# Patient Record
Sex: FEMALE | Race: WHITE | NOT HISPANIC OR LATINO | Employment: UNEMPLOYED | URBAN - NONMETROPOLITAN AREA
[De-identification: names, ages, dates, MRNs, and addresses within clinical notes are randomized per-mention and may not be internally consistent; named-entity substitution may affect disease eponyms.]

---

## 2023-05-11 ENCOUNTER — OFFICE VISIT (OUTPATIENT)
Dept: FAMILY MEDICINE CLINIC | Facility: CLINIC | Age: 53
End: 2023-05-11
Payer: MEDICAID

## 2023-05-11 VITALS
OXYGEN SATURATION: 97 % | TEMPERATURE: 97 F | HEART RATE: 78 BPM | BODY MASS INDEX: 20.88 KG/M2 | WEIGHT: 133 LBS | HEIGHT: 67 IN | SYSTOLIC BLOOD PRESSURE: 124 MMHG | RESPIRATION RATE: 16 BRPM | DIASTOLIC BLOOD PRESSURE: 70 MMHG

## 2023-05-11 DIAGNOSIS — M54.2 CERVICALGIA: Primary | ICD-10-CM

## 2023-05-11 NOTE — PROGRESS NOTES
Follow Up Office Visit      Date: 2023   Patient Name: Fatuma Bolton  : 1970   MRN: [id number]     Chief Complaint:    Chief Complaint   Patient presents with   • Neck Pain     Numbness in arms       History of Present Illness: Fatuma Bolton is a 52 y.o. female who is here today for evaluation of neck pain.  Patient has had previous neck surgery in the past and had symptoms of pain with radiation down her arms.  Patient was recently helping with her sister-in-law who collapsed in the bathroom prompting patient to try to catch her.  At that time she felt a pulling sensation of her neck and has had some pain and numbness down her arm since then.  She states that it is slightly improving and is not associated with any headaches or other symptomatology.  She states it is tender in the muscle region as well as radiation down to her thumbs.  She does have some weakness at present time.  She has tried other modalities that does not appear to be helping such as Tylenol and Motrin.  She denies any other issues at present time.  It has not affected her activity or sleep at present time.  Her appetite is unchanged.  She has otherwise had no other cardiovascular, respiratory, gastrointestinal, urologic or neurologic complaints.  Patient does state that when she smoke a cigarette her pain improves.    Subjective      Review of Systems:   Review of Systems   Constitutional: Negative for activity change, appetite change and fatigue.   Respiratory: Negative for cough and shortness of breath.    Cardiovascular: Negative for chest pain, palpitations and leg swelling.   Gastrointestinal: Negative for abdominal pain, constipation, diarrhea, nausea and vomiting.   Genitourinary: Negative for dysuria, flank pain, frequency and urgency.   Musculoskeletal: Positive for arthralgias, neck pain and neck stiffness. Negative for back pain, gait problem and myalgias.   Neurological: Positive for weakness and numbness. Negative  "for dizziness, tremors, light-headedness, headache and memory problem.   Psychiatric/Behavioral: Negative for hallucinations and sleep disturbance. The patient is not nervous/anxious.        I have reviewed the patients family history, social history, past medical history, past surgical history and have updated it as appropriate.     Medications:   No current outpatient medications on file.    Allergies:   Allergies   Allergen Reactions   • Penicillins Rash       Immunizations:     There is no immunization history on file for this patient.     Objective     Physical Exam: Please see above  Vital Signs:   Vitals:    05/11/23 1306   BP: 124/70   BP Location: Right arm   Patient Position: Sitting   Cuff Size: Adult   Pulse: 78   Resp: 16   Temp: 97 °F (36.1 °C)   SpO2: 97%   Weight: 60.3 kg (133 lb)   Height: 170.2 cm (67\")     Body mass index is 20.83 kg/m².  BMI is within normal parameters. No other follow-up for BMI required.       Physical Exam  Vitals and nursing note reviewed.   Constitutional:       Appearance: Normal appearance.   HENT:      Head: Normocephalic and atraumatic.      Nose: Nose normal.      Mouth/Throat:      Pharynx: Oropharynx is clear.   Eyes:      Extraocular Movements: Extraocular movements intact.      Pupils: Pupils are equal, round, and reactive to light.   Neck:      Thyroid: No thyroid mass or thyromegaly.      Trachea: Trachea normal.   Cardiovascular:      Rate and Rhythm: Normal rate and regular rhythm.      Pulses: Normal pulses. No decreased pulses.      Heart sounds: Normal heart sounds.   Pulmonary:      Effort: Pulmonary effort is normal.      Breath sounds: Normal breath sounds.   Abdominal:      General: Abdomen is flat. Bowel sounds are normal.      Palpations: Abdomen is soft.      Tenderness: There is no abdominal tenderness.   Musculoskeletal:      Cervical back: No signs of trauma, rigidity, torticollis or crepitus. Pain with movement and muscular tenderness present. No " spinous process tenderness. Decreased range of motion.      Right lower leg: No edema.      Left lower leg: No edema.   Lymphadenopathy:      Cervical: No cervical adenopathy.   Skin:     General: Skin is warm and dry.   Neurological:      General: No focal deficit present.      Mental Status: She is alert and oriented to person, place, and time.      Sensory: Sensation is intact.      Motor: Motor function is intact.      Coordination: Coordination is intact.   Psychiatric:         Attention and Perception: Attention normal.         Mood and Affect: Mood normal.         Speech: Speech normal.         Behavior: Behavior normal.         Procedures    Results:   Labs:   No results found for: HGBA1C, CMP, CBCDIFFPANEL, CREAT, TSH     POCT Results (if applicable):   No results found for this or any previous visit.    Imaging:   No valid procedures specified.     Measures:   Advanced Care Planning:   Patient does not have an advance directive, information provided.    Smoking Cessation:   less than 3 minutes spent counseling Will try to cut down    Assessment / Plan      Assessment/Plan:   Diagnoses and all orders for this visit:    1. Cervicalgia (Primary)   Patient does have a history of having neck pain in the past that did require surgical intervention by Dr. Estevez.  Patient's believes that this is very similar to what she had in the past.  She does not wish to have any steroids or muscle relaxers at present time.  Patient has agreed that she will have an x-ray obtained to assure us that there is no hardware or abnormality.  She will agree to prednisone if the x-ray does not show any abnormality.  If it does show concern referral back to the neurosurgeon will be necessary.  If she shows no improvement after the initiation of steroids we may need to send her back to the neurosurgeon anyway.  We will continue to monitor symptoms and if she has other problems or complaints further assessment treatment is necessary.  If  she has increased weakness or disuse or worsening numbness prior to evaluation she will contact us.  -     XR Spine Cervical Complete 4 or 5 View; Future        Follow Up:   No follow-ups on file.      At [facility], we believe that sharing information builds trust and better relationships. You are receiving this note because you recently visited [facility]. It is possible you will see health information before a provider has talked with you about it. This kind of information can be easy to misunderstand. To help you fully understand what it means for your health, we urge you to discuss this note with your provider.    Alfa Clay MD  [facility]

## 2023-05-12 ENCOUNTER — TELEPHONE (OUTPATIENT)
Dept: FAMILY MEDICINE CLINIC | Facility: CLINIC | Age: 53
End: 2023-05-12

## 2023-05-12 DIAGNOSIS — M54.2 CERVICALGIA: Primary | ICD-10-CM

## 2023-05-12 RX ORDER — PREDNISONE 10 MG/1
TABLET ORAL
Qty: 36 TABLET | Refills: 0 | Status: SHIPPED | OUTPATIENT
Start: 2023-05-12 | End: 2023-05-20

## 2023-05-12 RX ORDER — CYCLOBENZAPRINE HCL 10 MG
10 TABLET ORAL 3 TIMES DAILY PRN
Qty: 30 TABLET | Refills: 0 | Status: SHIPPED | OUTPATIENT
Start: 2023-05-12

## 2023-05-12 NOTE — TELEPHONE ENCOUNTER
Spoke with patient advised of xray result and orders per PCP, result is scanned in patient chart, patient verbalized understanding.

## 2023-06-12 ENCOUNTER — OFFICE VISIT (OUTPATIENT)
Dept: FAMILY MEDICINE CLINIC | Facility: CLINIC | Age: 53
End: 2023-06-12
Payer: MEDICAID

## 2023-06-12 VITALS
HEIGHT: 67 IN | DIASTOLIC BLOOD PRESSURE: 78 MMHG | TEMPERATURE: 96.8 F | OXYGEN SATURATION: 100 % | WEIGHT: 138 LBS | BODY MASS INDEX: 21.66 KG/M2 | HEART RATE: 88 BPM | SYSTOLIC BLOOD PRESSURE: 112 MMHG

## 2023-06-12 DIAGNOSIS — M54.2 CERVICALGIA: ICD-10-CM

## 2023-06-12 DIAGNOSIS — Z12.31 ENCOUNTER FOR SCREENING MAMMOGRAM FOR MALIGNANT NEOPLASM OF BREAST: ICD-10-CM

## 2023-06-12 DIAGNOSIS — Z12.11 ENCOUNTER FOR SCREENING COLONOSCOPY: ICD-10-CM

## 2023-06-12 DIAGNOSIS — Z00.00 WELL ADULT EXAM: Primary | ICD-10-CM

## 2023-06-12 DIAGNOSIS — F17.210 CIGARETTE NICOTINE DEPENDENCE WITHOUT COMPLICATION: ICD-10-CM

## 2023-06-12 DIAGNOSIS — Z23 NEED FOR VACCINATION AGAINST STREPTOCOCCUS PNEUMONIAE: ICD-10-CM

## 2023-06-12 DIAGNOSIS — R06.2 WHEEZE: ICD-10-CM

## 2023-06-12 LAB
HBA1C MFR BLD: 6 % (ref 4.8–5.6)
HCV AB SER DONR QL: NORMAL
VIT B12 BLD-MCNC: 301 PG/ML (ref 211–946)

## 2023-06-12 PROCEDURE — 84443 ASSAY THYROID STIM HORMONE: CPT | Performed by: FAMILY MEDICINE

## 2023-06-12 PROCEDURE — 85027 COMPLETE CBC AUTOMATED: CPT | Performed by: FAMILY MEDICINE

## 2023-06-12 PROCEDURE — 81003 URINALYSIS AUTO W/O SCOPE: CPT | Performed by: FAMILY MEDICINE

## 2023-06-12 PROCEDURE — 82607 VITAMIN B-12: CPT | Performed by: FAMILY MEDICINE

## 2023-06-12 PROCEDURE — 80053 COMPREHEN METABOLIC PANEL: CPT | Performed by: FAMILY MEDICINE

## 2023-06-12 PROCEDURE — 86803 HEPATITIS C AB TEST: CPT | Performed by: FAMILY MEDICINE

## 2023-06-12 PROCEDURE — 83036 HEMOGLOBIN GLYCOSYLATED A1C: CPT | Performed by: FAMILY MEDICINE

## 2023-06-12 PROCEDURE — 80061 LIPID PANEL: CPT | Performed by: FAMILY MEDICINE

## 2023-06-12 RX ORDER — ALBUTEROL SULFATE 90 UG/1
2 AEROSOL, METERED RESPIRATORY (INHALATION) EVERY 6 HOURS PRN
Qty: 18 G | Refills: 11 | Status: SHIPPED | OUTPATIENT
Start: 2023-06-12

## 2023-06-12 NOTE — PATIENT INSTRUCTIONS
Advance Care Planning and Advance Directives     You make decisions on a daily basis - decisions about where you want to live, your career, your home, your life. Perhaps one of the most important decisions you face is your choice for future medical care. Take time to talk with your family and your healthcare team and start planning today.  Advance Care Planning is a process that can help you:  Understand possible future healthcare decisions in light of your own experiences  Reflect on those decision in light of your goals and values  Discuss your decisions with those closest to you and the healthcare professionals that care for you  Make a plan by creating a document that reflects your wishes    Surrogate Decision Maker  In the event of a medical emergency, which has left you unable to communicate or to make your own decisions, you would need someone to make decisions for you.  It is important to discuss your preferences for medical treatment with this person while you are in good health.     Qualities of a surrogate decision maker:  Willing to take on this role and responsibility  Knows what you want for future medical care  Willing to follow your wishes even if they don't agree with them  Able to make difficult medical decisions under stressful circumstances    Advance Directives  These are legal documents you can create that will guide your healthcare team and decision maker(s) when needed. These documents can be stored in the electronic medical record.    Living Will - a legal document to guide your care if you have a terminal condition or a serious illness and are unable to communicate. States vary by statute in document names/types, but most forms may include one or more of the following:        -  Directions regarding life-prolonging treatments        -  Directions regarding artificially provided nutrition/hydration        -  Choosing a healthcare decision maker        -  Direction regarding organ/tissue  donation    Durable Power of  for Healthcare - this document names an -in-fact to make medical decisions for you, but it may also allow this person to make personal and financial decisions for you. Please seek the advice of an  if you need this type of document.    **Advance Directives are not required and no one may discriminate against you if you do not sign one.    Medical Orders  Many states allow specific forms/orders signed by your physician to record your wishes for medical treatment in your current state of health. This form, signed in personal communication with your physician, addresses resuscitation and other medical interventions that you may or may not want.      For more information or to schedule a time with a [facility] Advance Care Planning Facilitator contact: [email or web address] or call [phone] and someone will contact you directly.

## 2023-06-12 NOTE — PROGRESS NOTES
"     Female Physical Note      Date: 2023   Patient Name: Fatuma Bolton  : 1970   MRN: [id number]     Chief Complaint:    Chief Complaint   Patient presents with    Follow-up     ED follow up back pain       History of Present Illness: Fatuma Bolton is a 52 y.o. female who is here today for their annual health maintenance and physical.  Patient has continued to have problem with pain between her shoulder blades.  Patient does have a history of having cervical disc disease requiring surgery and was seen in the emergency department for evaluation.  It was noted at that time that she did have some abnormality of C5-C6 with some mild to moderate changes.  Patient is talking with the neurosurgeon and will hopefully have an appointment in the near future.  She does continue to have pain that goes down her arms.  She associates the pain when she coughs or sneezes that also will \"shoot down her legs\".  She denies any bowel bladder incontinence.  She is otherwise continue with normal activity, appetite and sleep.  She does not wish to take many medications at present time.  Patient denies any other cardiovascular, respiratory, gastrointestinal, urologic or neurologic complaints.      Subjective      Review of Systems:   Review of Systems   Constitutional:  Negative for activity change, appetite change and fatigue.   Respiratory:  Negative for cough and shortness of breath.    Cardiovascular:  Negative for chest pain, palpitations and leg swelling.   Gastrointestinal:  Negative for abdominal pain, blood in stool, constipation, diarrhea, nausea, vomiting, GERD and indigestion.   Genitourinary:  Negative for dysuria, flank pain, frequency and urgency.   Musculoskeletal:  Positive for arthralgias, neck pain and neck stiffness. Negative for back pain, gait problem, joint swelling and myalgias.   Neurological:  Negative for dizziness, weakness and memory problem.   Psychiatric/Behavioral:  Negative for sleep " disturbance and depressed mood. The patient is not nervous/anxious.      Past Medical History, Social History, Family History and Care Team were all reviewed with patient and updated as appropriate.     Medications:     Current Outpatient Medications:     albuterol sulfate  (90 Base) MCG/ACT inhaler, Inhale 2 puffs Every 6 (Six) Hours As Needed for Wheezing., Disp: 18 g, Rfl: 11    Allergies:   Allergies   Allergen Reactions    Penicillins Rash       Immunizations:  Health Maintenance Summary            Ordered - LUNG CANCER SCREENING (Yearly) Ordered on 6/12/2023      No completion, postpone, or frequency change history exists for this topic.              Ordered - HEPATITIS C SCREENING (Once) Ordered on 6/12/2023 05/11/2023  Postponed until 5/11/2024 by Alfa Clay MD (Patient Refused)              Ordered - MAMMOGRAM (Every 2 Years) Ordered on 6/12/2023 05/11/2023  Postponed until 5/11/2024 by Alfa Clay MD (Patient Refused)              Postponed - PAP SMEAR (Every 3 Years) Postponed until 5/11/2024 05/11/2023  Postponed until 5/11/2024 by Alfa Clay MD (Patient Refused)              Postponed - ZOSTER VACCINE (1 of 2) Postponed until 5/11/2024 05/11/2023  Postponed until 5/11/2024 by Alfa Clay MD (Patient Refused)              Postponed - COLORECTAL CANCER SCREENING (COLONOSCOPY - Every 10 Years) Postponed until 5/11/2024 05/11/2023  Postponed until 5/11/2024 by Alfa Clay MD (Patient Refused)              Postponed - COVID-19 Vaccine (1) Postponed until 5/13/2024 05/11/2023  Postponed until 5/13/2024 by Alfa Clay MD (Patient Refused)              INFLUENZA VACCINE (Yearly - August to March) Next due on 8/1/2023      No completion, postpone, or frequency change history exists for this topic.              ANNUAL PHYSICAL (Yearly) Next due on 6/12/2024 06/12/2023  Done     05/11/2023  Postponed until 5/11/2024 by Alfa Clay MD (Patient Refused)              TDAP/TD VACCINES (2 - Td or Tdap) Next due on 6/12/2033 06/12/2023  Imm Admin: Tdap    05/11/2023  Postponed until 5/11/2024 by Alfa Clay MD (Patient Refused)              Pneumococcal Vaccine 0-64 (Series Information) Completed      06/12/2023  Imm Admin: Pneumococcal Conjugate 20-Valent (PCV20)                     Orders Placed This Encounter   Procedures    Tdap Vaccine Greater Than or Equal To 8yo IM    Pneumococcal Conjugate Vaccine 20-Valent All        Colorectal Screening:   Ordered.  Last Completed Colonoscopy       This patient has no relevant Health Maintenance data.          Pap: Ordered.  Last Completed Pap Smear       This patient has no relevant Health Maintenance data.           Mammogram: Ordered.  Last Completed Mammogram       This patient has no relevant Health Maintenance data.             CT for Smoker (Age 50-80, 20 pk yr):   Ordered.  Bone Density/DEXA (Age 65 or high risk): Deferred.  Hep C (Age 18-79 once): Ordered.  HIV (Age 15-65 once): No results found for: HIV1X2  A1c: No results found for: HGBA1C   Lipid panel:  No results found for: LIPIDEXCLUSI    The ASCVD Risk score (Adrianne DK, et al., 2019) failed to calculate for the following reasons:    Cannot find a previous HDL lab    Cannot find a previous total cholesterol lab    Dermatology: Advise if necessary.  Ophthalmologist: Advised.  Dentist: Advised.    Tobacco Use: High Risk    Smoking Tobacco Use: Every Day    Smokeless Tobacco Use: Never    Passive Exposure: Current       Social History     Substance and Sexual Activity   Alcohol Use Not Currently        Social History     Substance and Sexual Activity   Drug Use Not Currently        Diet/Physical activity: Well-balanced diet/daily exercise.    Sexual Health: No issues at present time.  Menopause: No issues at present time.  Menstrual Cycles: No issues at  "present time.    Depression: PHQ-2 Depression Screening  PHQ-9 Total Score: 0     Measures:   Advanced Care Planning:   Patient does not have an advance directive, information provided.    Smoking Cessation:   less than 3 minutes spent counseling Will try to cut down    Objective     Physical Exam:  Vital Signs:   Vitals:    06/12/23 1129   BP: 112/78   BP Location: Left arm   Patient Position: Sitting   Cuff Size: Adult   Pulse: 88   Temp: 96.8 °F (36 °C)   SpO2: 100%   Weight: 62.6 kg (138 lb)   Height: 170.2 cm (67\")   PainSc:   8   PainLoc: Back     Body mass index is 21.61 kg/m².     Physical Exam  Vitals and nursing note reviewed.   Constitutional:       Appearance: Normal appearance.   HENT:      Head: Normocephalic and atraumatic.      Nose: Nose normal.      Mouth/Throat:      Pharynx: Oropharynx is clear.   Eyes:      Extraocular Movements: Extraocular movements intact.      Pupils: Pupils are equal, round, and reactive to light.   Neck:      Thyroid: No thyroid mass or thyromegaly.      Trachea: Trachea normal.   Cardiovascular:      Rate and Rhythm: Normal rate and regular rhythm.      Pulses: Normal pulses. No decreased pulses.      Heart sounds: Normal heart sounds.   Pulmonary:      Effort: Pulmonary effort is normal.      Breath sounds: Examination of the right-upper field reveals wheezing. Examination of the left-upper field reveals wheezing. Examination of the right-middle field reveals wheezing. Examination of the left-middle field reveals wheezing. Examination of the right-lower field reveals wheezing. Examination of the left-lower field reveals wheezing. Wheezing present.   Abdominal:      General: Abdomen is flat. Bowel sounds are normal.      Palpations: Abdomen is soft.      Tenderness: There is no abdominal tenderness.   Musculoskeletal:      Cervical back: Neck supple.      Right lower leg: No edema.      Left lower leg: No edema.   Lymphadenopathy:      Cervical: No cervical adenopathy. "   Skin:     General: Skin is warm and dry.   Neurological:      General: No focal deficit present.      Mental Status: She is alert and oriented to person, place, and time.      Sensory: Sensation is intact.      Motor: Motor function is intact.      Coordination: Coordination is intact.   Psychiatric:         Attention and Perception: Attention normal.         Mood and Affect: Mood normal.         Speech: Speech normal.         Behavior: Behavior normal.       POCT Results (if applicable);   No results found for this or any previous visit.     Procedures    Assessment / Plan      Assessment/Plan:   Diagnoses and all orders for this visit:    1. Well adult exam (Primary)  Patient wellness exam performed today.  She did well with respect to anxiety and depression symptomatology.  We did discuss safety issues as well as anticipatory guidance.  We will make arrangements for significant mount of wellness/preventative measures.  We will await the results and will pursue and treat accordingly.  -     CBC (No Diff); Future  -     Comprehensive Metabolic Panel; Future  -     Hemoglobin A1c; Future  -     Hepatitis C Antibody; Future  -     Lipid Panel; Future  -     Urinalysis without microscopic (no culture) - Urine, Clean Catch; Future  -     Vitamin B12; Future  -     TSH Rfx On Abnormal To Free T4; Future  -     CBC (No Diff)  -     Comprehensive Metabolic Panel  -     Hemoglobin A1c  -     Hepatitis C Antibody  -     Lipid Panel  -     Urinalysis without microscopic (no culture) - Urine, Clean Catch  -     Vitamin B12  -     TSH Rfx On Abnormal To Free T4    2. Cervicalgia  Patient does continue to have pain in her neck/between her shoulder blades.  It has been gradually worsening with symptoms worsening with coughing and sneezing.  We will obtain an MRI of her neck and will treat accordingly.  Patient is still in contact with the neurosurgical office and may have an appointment in the near future.  -     MRI Cervical  Spine Without Contrast; Future    3. Encounter for screening mammogram for malignant neoplasm of breast  Patient will have a mammogram ordered.  -     Mammo Screening Digital Tomosynthesis Bilateral With CAD; Future    4. Encounter for screening colonoscopy  Patient will have appointment scheduled for colonoscopy.  -     Ambulatory Referral to General Surgery    5. Need for vaccination against Streptococcus pneumoniae  Patient will receive a tetanus booster as well as Prevnar 20 vaccine today.  She understands risk and benefits of the vaccines.  -     Tdap Vaccine Greater Than or Equal To 8yo IM  -     Pneumococcal Conjugate Vaccine 20-Valent All    6. Cigarette nicotine dependence without complication  Patient will have a CT scan performed of her lungs to assess for pulmonary nodules.  She does have wheezing on exam.  Patient states that she has never been told she has COPD.  We will await results of the CT and will pursue and treat accordingly.  -      CT Chest Low Dose Cancer Screening WO; Future    7. Wheeze  Patient does have audible wheeze.  She has no history of COPD but does smoke.  We will start her on albuterol and await the results of the CT scan with possible need for initiation of other treatment.  -     albuterol sulfate  (90 Base) MCG/ACT inhaler; Inhale 2 puffs Every 6 (Six) Hours As Needed for Wheezing.  Dispense: 18 g; Refill: 11         Healthcare Maintenance:  Counseling provided based on age appropriate USPSTF guidelines.  BMI is within normal parameters. No other follow-up for BMI required.    Fatuma Bolton voices understanding and acceptance of this advice and will call back with any further questions or concerns. AVS with preventive healthcare tips printed for patient.     Follow Up:   Return for Annual physical with PAP with Rachel.        At [facility], we believe that sharing information builds trust and better relationships. You are receiving this note because you recently  visited [facility]. It is possible you will see health information before a provider has talked with you about it. This kind of information can be easy to misunderstand. To help you fully understand what it means for your health, we urge you to discuss this note with your provider.    Alfa Clay MD  [facility]

## 2023-06-13 LAB
ALBUMIN SERPL-MCNC: 4.8 G/DL (ref 3.5–5.2)
ALBUMIN/GLOB SERPL: 1.8 G/DL
ALP SERPL-CCNC: 79 U/L (ref 39–117)
ALT SERPL W P-5'-P-CCNC: 11 U/L (ref 1–33)
ANION GAP SERPL CALCULATED.3IONS-SCNC: 11.7 MMOL/L (ref 5–15)
AST SERPL-CCNC: 17 U/L (ref 1–32)
BILIRUB SERPL-MCNC: <0.2 MG/DL (ref 0–1.2)
BILIRUB UR QL STRIP: NEGATIVE
BUN SERPL-MCNC: 10 MG/DL (ref 6–20)
BUN/CREAT SERPL: 15.4 (ref 7–25)
CALCIUM SPEC-SCNC: 10.4 MG/DL (ref 8.6–10.5)
CHLORIDE SERPL-SCNC: 104 MMOL/L (ref 98–107)
CHOLEST SERPL-MCNC: 207 MG/DL (ref 0–200)
CLARITY UR: ABNORMAL
CO2 SERPL-SCNC: 25.3 MMOL/L (ref 22–29)
COLOR UR: YELLOW
CREAT SERPL-MCNC: 0.65 MG/DL (ref 0.57–1)
DEPRECATED RDW RBC AUTO: 39.3 FL (ref 37–54)
EGFRCR SERPLBLD CKD-EPI 2021: 105.4 ML/MIN/1.73
ERYTHROCYTE [DISTWIDTH] IN BLOOD BY AUTOMATED COUNT: 11.7 % (ref 12.3–15.4)
GLOBULIN UR ELPH-MCNC: 2.7 GM/DL
GLUCOSE SERPL-MCNC: 79 MG/DL (ref 65–99)
GLUCOSE UR STRIP-MCNC: NEGATIVE MG/DL
HCT VFR BLD AUTO: 43.8 % (ref 34–46.6)
HDLC SERPL-MCNC: 46 MG/DL (ref 40–60)
HGB BLD-MCNC: 14.6 G/DL (ref 12–15.9)
HGB UR QL STRIP.AUTO: NEGATIVE
KETONES UR QL STRIP: NEGATIVE
LDLC SERPL CALC-MCNC: 124 MG/DL (ref 0–100)
LDLC/HDLC SERPL: 2.6 {RATIO}
LEUKOCYTE ESTERASE UR QL STRIP.AUTO: ABNORMAL
MCH RBC QN AUTO: 30.5 PG (ref 26.6–33)
MCHC RBC AUTO-ENTMCNC: 33.3 G/DL (ref 31.5–35.7)
MCV RBC AUTO: 91.6 FL (ref 79–97)
NITRITE UR QL STRIP: POSITIVE
PH UR STRIP.AUTO: 7 [PH] (ref 5–8)
PLATELET # BLD AUTO: 404 10*3/MM3 (ref 140–450)
PMV BLD AUTO: 10.7 FL (ref 6–12)
POTASSIUM SERPL-SCNC: 5 MMOL/L (ref 3.5–5.2)
PROT SERPL-MCNC: 7.5 G/DL (ref 6–8.5)
PROT UR QL STRIP: ABNORMAL
RBC # BLD AUTO: 4.78 10*6/MM3 (ref 3.77–5.28)
SODIUM SERPL-SCNC: 141 MMOL/L (ref 136–145)
SP GR UR STRIP: 1.02 (ref 1–1.03)
TRIGL SERPL-MCNC: 208 MG/DL (ref 0–150)
TSH SERPL DL<=0.05 MIU/L-ACNC: 2.38 UIU/ML (ref 0.27–4.2)
UROBILINOGEN UR QL STRIP: ABNORMAL
VLDLC SERPL-MCNC: 37 MG/DL (ref 5–40)
WBC NRBC COR # BLD: 8.27 10*3/MM3 (ref 3.4–10.8)

## 2023-12-12 ENCOUNTER — TELEPHONE (OUTPATIENT)
Dept: FAMILY MEDICINE CLINIC | Facility: CLINIC | Age: 53
End: 2023-12-12
Payer: MEDICAID

## 2024-10-09 DIAGNOSIS — R06.2 WHEEZE: ICD-10-CM

## 2024-10-09 RX ORDER — ALBUTEROL SULFATE 90 UG/1
2 INHALANT RESPIRATORY (INHALATION) EVERY 6 HOURS PRN
Qty: 18 G | Refills: 11 | Status: CANCELLED | OUTPATIENT
Start: 2024-10-09

## 2024-10-09 RX ORDER — ALBUTEROL SULFATE 90 UG/1
2 INHALANT RESPIRATORY (INHALATION) EVERY 6 HOURS PRN
Qty: 18 G | Refills: 11 | Status: SHIPPED | OUTPATIENT
Start: 2024-10-09

## 2024-10-14 ENCOUNTER — OFFICE VISIT (OUTPATIENT)
Dept: FAMILY MEDICINE CLINIC | Facility: CLINIC | Age: 54
End: 2024-10-14
Payer: MEDICAID

## 2024-10-14 VITALS
SYSTOLIC BLOOD PRESSURE: 112 MMHG | RESPIRATION RATE: 18 BRPM | WEIGHT: 130 LBS | BODY MASS INDEX: 20.4 KG/M2 | DIASTOLIC BLOOD PRESSURE: 60 MMHG | TEMPERATURE: 98 F | HEIGHT: 67 IN | HEART RATE: 70 BPM | OXYGEN SATURATION: 95 %

## 2024-10-14 DIAGNOSIS — Z28.21 IMMUNIZATION DECLINED: ICD-10-CM

## 2024-10-14 DIAGNOSIS — Z12.31 ENCOUNTER FOR SCREENING MAMMOGRAM FOR MALIGNANT NEOPLASM OF BREAST: ICD-10-CM

## 2024-10-14 DIAGNOSIS — Z00.00 WELL ADULT EXAM: Primary | ICD-10-CM

## 2024-10-14 DIAGNOSIS — F17.210 CIGARETTE NICOTINE DEPENDENCE WITHOUT COMPLICATION: ICD-10-CM

## 2024-10-14 DIAGNOSIS — Z12.11 COLON CANCER SCREENING: ICD-10-CM

## 2024-10-14 PROCEDURE — 1125F AMNT PAIN NOTED PAIN PRSNT: CPT | Performed by: FAMILY MEDICINE

## 2024-10-14 PROCEDURE — 99396 PREV VISIT EST AGE 40-64: CPT | Performed by: FAMILY MEDICINE

## 2024-10-14 PROCEDURE — 1160F RVW MEDS BY RX/DR IN RCRD: CPT | Performed by: FAMILY MEDICINE

## 2024-10-14 PROCEDURE — 1159F MED LIST DOCD IN RCRD: CPT | Performed by: FAMILY MEDICINE

## 2024-10-14 NOTE — PROGRESS NOTES
Female Physical Note      Date: 10/14/2024   Patient Name: Fatuma Bolton  : 1970   MRN: [id number]     Chief Complaint:    Chief Complaint   Patient presents with    Annual Exam       History of Present Illness: Fatuma Bolton is a 54 y.o. female who is here today for their annual health maintenance and physical.  Patient has been doing relatively well since last being seen.  She was unable to have all of her screening health maintenance performed last year due to the death of her father and her having to settle the estate in [place].  Patient feels that she is doing well otherwise at present time.  She is wanting to pursue the health maintenance as previously encouraged.  She denies any other concerns at present time.  Patient appears to be doing otherwise well.  They have continue with their medications without any side effects.  They have not had any changes in their usual activity, appetite and sleep.  Patient denies any other cardiovascular, respiratory, gastrointestinal, urologic or neurologic complaints.      Subjective      Review of Systems:   Review of Systems   Constitutional:  Negative for activity change, appetite change and fatigue.   Respiratory:  Negative for cough, chest tightness, shortness of breath and wheezing.    Cardiovascular:  Negative for chest pain, palpitations and leg swelling.   Gastrointestinal:  Negative for abdominal distention, abdominal pain, blood in stool, constipation, diarrhea, nausea, vomiting, GERD and indigestion.   Genitourinary:  Negative for difficulty urinating, dysuria, flank pain, frequency, hematuria and urgency.   Musculoskeletal:  Negative for arthralgias, back pain, gait problem, joint swelling and myalgias.   Neurological:  Negative for dizziness, tremors, seizures, syncope, weakness, light-headedness, numbness, headache and memory problem.   Psychiatric/Behavioral:  Negative for sleep disturbance and depressed mood. The patient is not  nervous/anxious.        Past Medical History, Social History, Family History and Care Team were all reviewed with patient and updated as appropriate.     Medications:     Current Outpatient Medications:     albuterol sulfate  (90 Base) MCG/ACT inhaler, Inhale 2 puffs Every 6 (Six) Hours As Needed for Wheezing., Disp: 18 g, Rfl: 11    Allergies:   Allergies   Allergen Reactions    Penicillins Rash       Immunizations:  Health Maintenance Summary            Ordered - MAMMOGRAM (Every 2 Years) Ordered on 10/14/2024      05/11/2023  Postponed until 5/11/2024 by Alfa Clay MD (Patient Refused)              Overdue - COLORECTAL CANCER SCREENING (View Topic Details) Never done      05/11/2023  Postponed until 5/11/2024 by Alfa Clay MD (Patient Refused)              Overdue - ZOSTER VACCINE (1 of 2) Never done      10/14/2024  Postponed until 10/14/2024 by Afla Clay MD (Patient Refused)    05/11/2023  Postponed until 5/11/2024 by Alfa Clay MD (Patient Refused)              Ordered - LUNG CANCER SCREENING (Yearly) Ordered on 10/14/2024      No completion, postpone, or frequency change history exists for this topic.              Overdue - PAP SMEAR (Every 3 Years) Never done      05/11/2023  Postponed until 5/11/2024 by Alfa Clay MD (Patient Refused)              Postponed - COVID-19 Vaccine (1 - 2023-24 season) Postponed until 10/16/2024      10/14/2024  Postponed until 10/16/2024 by Alfa Clay MD (Patient Refused)    05/11/2023  Postponed until 5/13/2024 by Alfa Clay MD (Patient Refused)              Postponed - INFLUENZA VACCINE (Yearly - August to March) Postponed until 3/31/2025      10/14/2024  Postponed until 3/31/2025 by Alfa Clay MD (Patient Refused)              ANNUAL PHYSICAL (Yearly) Next due on 10/14/2025      10/14/2024  Done    06/12/2023  Done    05/11/2023  Postponed  "until 5/11/2024 by Alfa Clay MD (Patient Refused)              TDAP/TD VACCINES (2 - Td or Tdap) Next due on 6/12/2033 06/12/2023  Imm Admin: Tdap    05/11/2023  Postponed until 5/11/2024 by Alfa Clay MD (Patient Refused)              HEPATITIS C SCREENING  Completed      06/12/2023  Hepatitis C Antibody    05/11/2023  Postponed until 5/11/2024 by Alfa Clay MD (Patient Refused)              Pneumococcal Vaccine 0-64 (Series Information) Completed      06/12/2023  Imm Admin: Pneumococcal Conjugate 20-Valent (PCV20)                     No orders of the defined types were placed in this encounter.       Colorectal Screening:   Ordered.  Last Completed Colonoscopy       This patient has no relevant Health Maintenance data.          Pap: Ordered.  Last Completed Pap Smear       This patient has no relevant Health Maintenance data.           Mammogram: Ordered.  Last Completed Mammogram       This patient has no relevant Health Maintenance data.             CT for Smoker (Age 50-80, 20 pk yr):   Ordered.  Bone Density/DEXA (Age 65 or high risk): Deferred.  Hep C (Age 18-79 once): Ordered.  HIV (Age 15-65 once): No results found for: \"HIV1X2\"  A1c:   Hemoglobin A1C   Date Value Ref Range Status   06/12/2023 6.00 (H) 4.80 - 5.60 % Final      Lipid panel:  No results found for: \"LIPIDEXCLUSI\"    The 10-year ASCVD risk score (Adrianne DK, et al., 2019) is: 4.6%    Values used to calculate the score:      Age: 54 years      Sex: Female      Is Non- : No      Diabetic: No      Tobacco smoker: Yes      Systolic Blood Pressure: 112 mmHg      Is BP treated: No      HDL Cholesterol: 46 mg/dL      Total Cholesterol: 207 mg/dL    Dermatology: Advised if necessary.  Ophthalmologist: Advised.  Dentist: Advised.    Tobacco Use: High Risk (10/14/2024)    Patient History     Smoking Tobacco Use: Every Day     Smokeless Tobacco Use: Never     Passive Exposure: " "Current       Social History     Substance and Sexual Activity   Alcohol Use Not Currently        Social History     Substance and Sexual Activity   Drug Use Not Currently        Diet/Physical activity: Well-balanced diet/daily exercise.    Sexual Health: No issues at present time.   Menopause: No issues at present time.  Menstrual Cycles: Amenorrheic.    Depression: PHQ-2 Depression Screening  PHQ-9 Total Score:  0    Measures:   Advanced Care Planning:   Patient does not have an advance directive, information provided.    Smoking Cessation:   Non-smoker.    Objective     Physical Exam:  Vital Signs:   Vitals:    10/14/24 1548   BP: 112/60   BP Location: Left arm   Patient Position: Sitting   Cuff Size: Adult   Pulse: 70   Resp: 18   Temp: 98 °F (36.7 °C)   TempSrc: Temporal   SpO2: 95%   Weight: 59 kg (130 lb)   Height: 170.2 cm (67.01\")     Body mass index is 20.36 kg/m².   Facility age limit for growth %flip is 20 years.    Physical Exam  Vitals and nursing note reviewed.   Constitutional:       Appearance: Normal appearance.   HENT:      Head: Normocephalic and atraumatic.      Nose: Nose normal.      Mouth/Throat:      Pharynx: Oropharynx is clear.   Eyes:      Extraocular Movements: Extraocular movements intact.      Pupils: Pupils are equal, round, and reactive to light.   Neck:      Thyroid: No thyroid mass or thyromegaly.      Trachea: Trachea normal.   Cardiovascular:      Rate and Rhythm: Normal rate and regular rhythm.      Pulses: Normal pulses. No decreased pulses.      Heart sounds: Normal heart sounds.   Pulmonary:      Effort: Pulmonary effort is normal.      Breath sounds: Normal breath sounds.   Abdominal:      General: Abdomen is flat. Bowel sounds are normal.      Palpations: Abdomen is soft.      Tenderness: There is no abdominal tenderness.   Musculoskeletal:      Cervical back: Neck supple.      Right lower leg: No edema.      Left lower leg: No edema.   Lymphadenopathy:      Cervical: No " cervical adenopathy.   Skin:     General: Skin is warm and dry.   Neurological:      General: No focal deficit present.      Mental Status: She is alert and oriented to person, place, and time.      Sensory: Sensation is intact.      Motor: Motor function is intact.      Coordination: Coordination is intact.   Psychiatric:         Attention and Perception: Attention normal.         Mood and Affect: Mood normal.         Speech: Speech normal.         Behavior: Behavior normal.         POCT Results (if applicable);   Results for orders placed or performed in visit on 06/12/23   CBC (No Diff)    Collection Time: 06/12/23 12:31 PM    Specimen: Arm, Right; Blood   Result Value Ref Range    WBC 8.27 3.40 - 10.80 10*3/mm3    RBC 4.78 3.77 - 5.28 10*6/mm3    Hemoglobin 14.6 12.0 - 15.9 g/dL    Hematocrit 43.8 34.0 - 46.6 %    MCV 91.6 79.0 - 97.0 fL    MCH 30.5 26.6 - 33.0 pg    MCHC 33.3 31.5 - 35.7 g/dL    RDW 11.7 (L) 12.3 - 15.4 %    RDW-SD 39.3 37.0 - 54.0 fl    MPV 10.7 6.0 - 12.0 fL    Platelets 404 140 - 450 10*3/mm3   Comprehensive Metabolic Panel    Collection Time: 06/12/23 12:31 PM    Specimen: Arm, Right; Blood   Result Value Ref Range    Glucose 79 65 - 99 mg/dL    BUN 10 6 - 20 mg/dL    Creatinine 0.65 0.57 - 1.00 mg/dL    Sodium 141 136 - 145 mmol/L    Potassium 5.0 3.5 - 5.2 mmol/L    Chloride 104 98 - 107 mmol/L    CO2 25.3 22.0 - 29.0 mmol/L    Calcium 10.4 8.6 - 10.5 mg/dL    Total Protein 7.5 6.0 - 8.5 g/dL    Albumin 4.8 3.5 - 5.2 g/dL    ALT (SGPT) 11 1 - 33 U/L    AST (SGOT) 17 1 - 32 U/L    Alkaline Phosphatase 79 39 - 117 U/L    Total Bilirubin <0.2 0.0 - 1.2 mg/dL    Globulin 2.7 gm/dL    A/G Ratio 1.8 g/dL    BUN/Creatinine Ratio 15.4 7.0 - 25.0    Anion Gap 11.7 5.0 - 15.0 mmol/L    eGFR 105.4 >60.0 mL/min/1.73   Hemoglobin A1c    Collection Time: 06/12/23 12:31 PM    Specimen: Arm, Right; Blood   Result Value Ref Range    Hemoglobin A1C 6.00 (H) 4.80 - 5.60 %   Hepatitis C Antibody     Collection Time: 06/12/23 12:31 PM    Specimen: Arm, Right; Blood   Result Value Ref Range    Hepatitis C Ab Non-Reactive Non-Reactive   Lipid Panel    Collection Time: 06/12/23 12:31 PM    Specimen: Arm, Right; Blood   Result Value Ref Range    Total Cholesterol 207 (H) 0 - 200 mg/dL    Triglycerides 208 (H) 0 - 150 mg/dL    HDL Cholesterol 46 40 - 60 mg/dL    LDL Cholesterol  124 (H) 0 - 100 mg/dL    VLDL Cholesterol 37 5 - 40 mg/dL    LDL/HDL Ratio 2.60    Urinalysis without microscopic (no culture) - Urine, Clean Catch    Collection Time: 06/12/23 12:31 PM    Specimen: Urine, Clean Catch   Result Value Ref Range    Color, UA Yellow Yellow, Straw    Appearance, UA Cloudy (A) Clear    pH, UA 7.0 5.0 - 8.0    Specific Gravity, UA 1.018 1.005 - 1.030    Glucose, UA Negative Negative    Ketones, UA Negative Negative    Bilirubin, UA Negative Negative    Blood, UA Negative Negative    Protein, UA Trace (A) Negative    Leuk Esterase, UA Small (1+) (A) Negative    Nitrite, UA Positive (A) Negative    Urobilinogen, UA 1.0 E.U./dL 0.2 - 1.0 E.U./dL   Vitamin B12    Collection Time: 06/12/23 12:31 PM    Specimen: Arm, Right; Blood   Result Value Ref Range    Vitamin B-12 301 211 - 946 pg/mL   TSH Rfx On Abnormal To Free T4    Collection Time: 06/12/23 12:31 PM    Specimen: Arm, Right; Blood   Result Value Ref Range    TSH 2.380 0.270 - 4.200 uIU/mL        Procedures    Assessment / Plan      Assessment/Plan:   Diagnoses and all orders for this visit:    1. Well adult exam (Primary)  Patient did have a wellness exam performed today that did not reveal any abnormality.  Patient's anxiety/depression scores were reviewed.  We will continue to monitor laboratory data as well as symptomatology and if there are any other questions or concerns prior to the next scheduled follow-up they will contact us.  -     CBC (No Diff); Future  -     Comprehensive Metabolic Panel; Future  -     Hemoglobin A1c; Future  -     Lipid Panel;  Future  -     Urinalysis without microscopic (no culture) - Urine, Clean Catch; Future  -     Vitamin B12; Future  -     TSH Rfx On Abnormal To Free T4; Future    2. Encounter for screening mammogram for malignant neoplasm of breast  Mammogram has been reordered.  -     Mammo Screening Digital Tomosynthesis Bilateral With CAD; Future    3. Colon cancer screening  Consult for cleanings colonoscopy has been reordered.  -     Ambulatory Referral to General Surgery    4. Cigarette nicotine dependence without complication  Low-dose CT scan has been reordered.  -      CT Chest Low Dose Cancer Screening WO; Future    5.  Immunizations declined   Patient would benefit from having immunizations given.  Patient has elected not to receive the recommended vaccines at present time.  They understand the risk of not receiving these vaccine and the disease in which they may incur.  We have discussed other options and will pursue with encouragement of compliance with future appointments.  If patient does change their minds prior to the next scheduled follow-up, they may contact us and we will provide immunization at that time.    Healthcare Maintenance:  Counseling provided based on age appropriate USPSTF guidelines.  BMI is within normal parameters. No other follow-up for BMI required.    Fatuma Bolton voices understanding and acceptance of this advice and will call back with any further questions or concerns. AVS with preventive healthcare tips printed for patient.     Follow Up:   Return for Annual physical with PAP with Rachel.        At [facility], we believe that sharing information builds trust and better relationships. You are receiving this note because you recently visited [facility]. It is possible you will see health information before a provider has talked with you about it. This kind of information can be easy to misunderstand. To help you fully understand what it means for your health, we urge you to discuss  this note with your provider.    Alfa Clay MD  [facility]

## 2024-10-24 ENCOUNTER — TELEPHONE (OUTPATIENT)
Dept: SURGERY | Facility: CLINIC | Age: 54
End: 2024-10-24
Payer: MEDICAID

## 2024-10-24 NOTE — TELEPHONE ENCOUNTER
PRESCREENING FOR OPEN ACCESS SCHEDULING    Fatuma Bolton, 1970  [id number]    10/24/24    If, the patient answers yes to any of the following questions the provider will be informed prior to scheduling open access for approval and documented in the chart.    []  Yes  [x] No    1. Have you ever had a colonoscopy in the past?      When:        Where:       Polyps or other:     []  Yes  [x] No    2. Family history of colon cancer?      Relation:       Age of onset:       Do you currently have any of the following?    []  Yes  [x] No  Rectal bleeding, if so, how long?     []  Yes  [x] No  Abdominal pain, if so, how long?    []  Yes  [x] No  Constipation, if so, how long?    []  Yes  [x] No  Diarrhea, if so, how long?    []  Yes  [x] No  Weight loss, is so, how much?    [] Yes  [x] No  Small caliber stool, if so, how long?    []Yes  [x] No  Do you have Hemorroids?    []Yes  [x] No  Have you been diagnosed with Anemia?    []Yes  [x] No  Do you have difficulty swallowing?    []Yes  [x] No  Do you have acid reflux?    Have you ever had any of the following conditions?    [] Yes  [x] No  Heart attack?      When?       Last cardiac workup?     Blood thinners?    [] Yes  [x] No   Lung problems, asthma or COPD?  [] Yes  [x] No  Oxygen required?       [] Yes  [x] No  Stroke?     [] Yes  [x] No  Have you ever had a reaction to anesthesia?

## 2024-10-24 NOTE — TELEPHONE ENCOUNTER
Pt would like to be scheduled at [facility] on 12/18 with Dr. Augustin-verified pharmacy/insurance. Thank you.

## 2024-10-25 RX ORDER — POLYETHYLENE GLYCOL 3350 17 G/17G
238 POWDER, FOR SOLUTION ORAL DAILY
Qty: 238 G | Refills: 0 | Status: SHIPPED | OUTPATIENT
Start: 2024-10-25 | End: 2024-10-26

## 2024-10-25 RX ORDER — BISACODYL 5 MG/1
TABLET, DELAYED RELEASE ORAL
Qty: 4 TABLET | Refills: 0 | Status: SHIPPED | OUTPATIENT
Start: 2024-10-25

## 2024-10-25 NOTE — TELEPHONE ENCOUNTER
No previous colonoscopy, no family history of colon cancer. Bowel prep sent to [facility] in [place].

## 2024-10-28 ENCOUNTER — LAB (OUTPATIENT)
Dept: FAMILY MEDICINE CLINIC | Facility: CLINIC | Age: 54
End: 2024-10-28
Payer: MEDICAID

## 2024-10-28 ENCOUNTER — OFFICE VISIT (OUTPATIENT)
Dept: FAMILY MEDICINE CLINIC | Facility: CLINIC | Age: 54
End: 2024-10-28
Payer: MEDICAID

## 2024-10-28 VITALS
WEIGHT: 127 LBS | BODY MASS INDEX: 19.93 KG/M2 | RESPIRATION RATE: 18 BRPM | OXYGEN SATURATION: 99 % | HEIGHT: 67 IN | TEMPERATURE: 98 F | HEART RATE: 82 BPM | SYSTOLIC BLOOD PRESSURE: 102 MMHG | DIASTOLIC BLOOD PRESSURE: 60 MMHG

## 2024-10-28 DIAGNOSIS — Z12.11 ENCOUNTER FOR SCREENING COLONOSCOPY: Primary | ICD-10-CM

## 2024-10-28 DIAGNOSIS — Z01.419 ROUTINE GYNECOLOGICAL EXAMINATION: ICD-10-CM

## 2024-10-28 DIAGNOSIS — Z01.419 ROUTINE GYNECOLOGICAL EXAMINATION: Primary | ICD-10-CM

## 2024-10-28 DIAGNOSIS — Z00.00 WELL ADULT EXAM: ICD-10-CM

## 2024-10-28 LAB
BILIRUB UR QL STRIP: NEGATIVE
CHOLEST SERPL-MCNC: 215 MG/DL (ref 0–200)
CLARITY UR: ABNORMAL
COLOR UR: ABNORMAL
DEPRECATED RDW RBC AUTO: 40.4 FL (ref 37–54)
ERYTHROCYTE [DISTWIDTH] IN BLOOD BY AUTOMATED COUNT: 11.9 % (ref 12.3–15.4)
GLUCOSE UR STRIP-MCNC: NEGATIVE MG/DL
HCT VFR BLD AUTO: 45.8 % (ref 34–46.6)
HDLC SERPL-MCNC: 49 MG/DL (ref 40–60)
HGB BLD-MCNC: 15 G/DL (ref 12–15.9)
HGB UR QL STRIP.AUTO: ABNORMAL
KETONES UR QL STRIP: NEGATIVE
LDLC SERPL CALC-MCNC: 143 MG/DL (ref 0–100)
LDLC/HDLC SERPL: 2.86 {RATIO}
LEUKOCYTE ESTERASE UR QL STRIP.AUTO: ABNORMAL
MCH RBC QN AUTO: 30.4 PG (ref 26.6–33)
MCHC RBC AUTO-ENTMCNC: 32.8 G/DL (ref 31.5–35.7)
MCV RBC AUTO: 92.7 FL (ref 79–97)
NITRITE UR QL STRIP: POSITIVE
PH UR STRIP.AUTO: 6 [PH] (ref 5–8)
PLATELET # BLD AUTO: 346 10*3/MM3 (ref 140–450)
PMV BLD AUTO: 10.8 FL (ref 6–12)
PROT UR QL STRIP: ABNORMAL
RBC # BLD AUTO: 4.94 10*6/MM3 (ref 3.77–5.28)
SP GR UR STRIP: 1.02 (ref 1–1.03)
TRIGL SERPL-MCNC: 130 MG/DL (ref 0–150)
TSH SERPL DL<=0.05 MIU/L-ACNC: 1.59 UIU/ML (ref 0.27–4.2)
UROBILINOGEN UR QL STRIP: ABNORMAL
VLDLC SERPL-MCNC: 23 MG/DL (ref 5–40)
WBC NRBC COR # BLD AUTO: 8.97 10*3/MM3 (ref 3.4–10.8)

## 2024-10-28 PROCEDURE — 80053 COMPREHEN METABOLIC PANEL: CPT | Performed by: FAMILY MEDICINE

## 2024-10-28 PROCEDURE — 81003 URINALYSIS AUTO W/O SCOPE: CPT | Performed by: FAMILY MEDICINE

## 2024-10-28 PROCEDURE — 85027 COMPLETE CBC AUTOMATED: CPT | Performed by: FAMILY MEDICINE

## 2024-10-28 PROCEDURE — 82607 VITAMIN B-12: CPT | Performed by: FAMILY MEDICINE

## 2024-10-28 PROCEDURE — 84443 ASSAY THYROID STIM HORMONE: CPT | Performed by: FAMILY MEDICINE

## 2024-10-28 PROCEDURE — 80061 LIPID PANEL: CPT | Performed by: FAMILY MEDICINE

## 2024-10-28 PROCEDURE — 83036 HEMOGLOBIN GLYCOSYLATED A1C: CPT | Performed by: FAMILY MEDICINE

## 2024-10-28 PROCEDURE — 36415 COLL VENOUS BLD VENIPUNCTURE: CPT | Performed by: FAMILY MEDICINE

## 2024-10-28 RX ORDER — SODIUM CHLORIDE 0.9 % (FLUSH) 0.9 %
10 SYRINGE (ML) INJECTION AS NEEDED
OUTPATIENT
Start: 2024-10-28

## 2024-10-28 RX ORDER — SODIUM CHLORIDE 0.9 % (FLUSH) 0.9 %
10 SYRINGE (ML) INJECTION EVERY 12 HOURS SCHEDULED
OUTPATIENT
Start: 2024-10-28

## 2024-10-28 NOTE — PROGRESS NOTES
Female Physical Note      Date: 10/28/2024   Patient Name: Fatuma Bolton  : 1970   MRN: [id number]     Chief Complaint:    Chief Complaint   Patient presents with    Gynecologic Exam       History of Present Illness: Ftauma Bolton is a 54 y.o. female who is here today for their annual health maintenance and physical. She reports that she is rarely sexually active but has been monogamous for 17 years, she has not had a period in over a year and does experience hot flashes but they are tolerable.  She has never had a bad result on a Pap.  Her ammogram is scheduled.  She has no other complaints.      Subjective      Review of Systems:   Review of Systems   Constitutional: Negative.    HENT: Negative.     Respiratory: Negative.     Cardiovascular: Negative.    Gastrointestinal: Negative.    Genitourinary:  Positive for amenorrhea and decreased libido. Negative for breast discharge, breast lump, breast pain, difficulty urinating, dyspareunia, dysuria, genital sores and menstrual problem.       Past Medical History, Social History, Family History and Care Team were all reviewed with patient and updated as appropriate.     Medications:     Current Outpatient Medications:     albuterol sulfate  (90 Base) MCG/ACT inhaler, Inhale 2 puffs Every 6 (Six) Hours As Needed for Wheezing. (Patient not taking: Reported on 10/28/2024), Disp: 18 g, Rfl: 11    bisacodyl (Dulcolax) 5 MG EC tablet, Take 2 tablets at 3 pm and 2 tablets at 7 pm the day prior to colonoscopy (Patient not taking: Reported on 10/28/2024), Disp: 4 tablet, Rfl: 0    Allergies:   Allergies   Allergen Reactions    Penicillins Rash       Colorectal Screening:   pending   Last Completed Colonoscopy       This patient has no relevant Health Maintenance data.          Pap:  10/28/2024   Last Completed Pap Smear       This patient has no relevant Health Maintenance data.           Mammogram:  pending   Last Completed Mammogram       This patient  "has no relevant Health Maintenance data.               Diet/Physical activity:balanced diet, low carb    Sexual Health: momogamous     Depression: PHQ-2 Depression Screening  PHQ-9 Total Score:          Objective     Physical Exam:  Vital Signs:   Vitals:    10/28/24 1306   BP: 102/60   BP Location: Right arm   Patient Position: Sitting   Cuff Size: Adult   Pulse: 82   Resp: 18   Temp: 98 °F (36.7 °C)   TempSrc: Temporal   SpO2: 99%   Weight: 57.6 kg (127 lb)   Height: 170.2 cm (67.01\")     Body mass index is 19.89 kg/m².   Facility age limit for growth %flip is 20 years.  BMI is within normal parameters. No other follow-up for BMI required.      Physical Exam  Vitals and nursing note reviewed. Exam conducted with a chaperone present.   Chest:   Breasts:     Right: Normal. No swelling, bleeding, inverted nipple, mass or nipple discharge.      Left: Normal. No swelling, bleeding, inverted nipple, mass or nipple discharge.   Abdominal:      Hernia: There is no hernia in the left inguinal area or right inguinal area.   Genitourinary:     General: Normal vulva.      Exam position: Lithotomy position.      Labia:         Right: No rash or tenderness.       Urethra: No prolapse or urethral swelling.      Vagina: Normal.      Cervix: Normal and dilated.      Uterus: Normal.       Adnexa: Right adnexa normal and left adnexa normal.        Right: No mass, tenderness or fullness.          Left: No mass, tenderness or fullness.     Lymphadenopathy:      Upper Body:      Right upper body: No supraclavicular, axillary or pectoral adenopathy.      Left upper body: No supraclavicular, axillary or pectoral adenopathy.      Lower Body: No right inguinal adenopathy. No left inguinal adenopathy.           Assessment / Plan      Assessment/Plan:   1. Routine gynecological examination  Without abnormal findings  - LIQUID-BASED PAP SMEAR, [facility] ([facility]); Future      Counseled on health maintenance topics and preventative care " recommendations.     Patient was given instructions and counseling regarding her condition or for health maintenance advice. Please see specific information pulled into the AVS if appropriate.     Follow Up:   No follow-ups on file.    At [facility], we believe that sharing information builds trust and better relationships. You are receiving this note because you recently visited [facility]. It is possible you will see health information before a provider has talked with you about it. This kind of information can be easy to misunderstand. To help you fully understand what it means for your health, we urge you to discuss this note with your provider.    Rachel Clay PA-C  [facility]

## 2024-10-28 NOTE — TELEPHONE ENCOUNTER
Case request sent to Dr. Augustin, documented in OR book, mailed pre-op form and bowel prep instructions to home address.

## 2024-10-29 DIAGNOSIS — N30.00 ACUTE CYSTITIS WITHOUT HEMATURIA: Primary | ICD-10-CM

## 2024-10-29 LAB
ALBUMIN SERPL-MCNC: 4.7 G/DL (ref 3.5–5.2)
ALBUMIN/GLOB SERPL: 1.7 G/DL
ALP SERPL-CCNC: 94 U/L (ref 39–117)
ALT SERPL W P-5'-P-CCNC: 10 U/L (ref 1–33)
ANION GAP SERPL CALCULATED.3IONS-SCNC: 11.7 MMOL/L (ref 5–15)
AST SERPL-CCNC: 16 U/L (ref 1–32)
BILIRUB SERPL-MCNC: 0.3 MG/DL (ref 0–1.2)
BUN SERPL-MCNC: 10 MG/DL (ref 6–20)
BUN/CREAT SERPL: 12.8 (ref 7–25)
CALCIUM SPEC-SCNC: 9.8 MG/DL (ref 8.6–10.5)
CHLORIDE SERPL-SCNC: 102 MMOL/L (ref 98–107)
CO2 SERPL-SCNC: 25.3 MMOL/L (ref 22–29)
CREAT SERPL-MCNC: 0.78 MG/DL (ref 0.57–1)
EGFRCR SERPLBLD CKD-EPI 2021: 89.8 ML/MIN/1.73
GLOBULIN UR ELPH-MCNC: 2.7 GM/DL
GLUCOSE SERPL-MCNC: 88 MG/DL (ref 65–99)
HBA1C MFR BLD: 5.9 % (ref 4.8–5.6)
POTASSIUM SERPL-SCNC: 4.5 MMOL/L (ref 3.5–5.2)
PROT SERPL-MCNC: 7.4 G/DL (ref 6–8.5)
SODIUM SERPL-SCNC: 139 MMOL/L (ref 136–145)
VIT B12 BLD-MCNC: 325 PG/ML (ref 211–946)

## 2024-10-29 RX ORDER — CIPROFLOXACIN 250 MG/1
250 TABLET, FILM COATED ORAL 2 TIMES DAILY
Qty: 6 TABLET | Refills: 0 | Status: SHIPPED | OUTPATIENT
Start: 2024-10-29 | End: 2024-11-01

## 2024-10-29 NOTE — PROGRESS NOTES
"Per PCP \"Please let patient know that I have sent in a 3 day course of antibiotics for infection.  She should take it in light of recent vaginal changes\"    Spoke with patient regarding medications no questions or concerns"

## 2024-10-29 NOTE — PROGRESS NOTES
Contacted the patient to discuss blood work. The patient expressed good understanding and appreciation.   Patient states only things going on at this moment is irration and vaginal discharge but no burning or frequency when urinating.

## 2024-10-31 DIAGNOSIS — A59.01 TRICHOMONAS VAGINITIS: Primary | ICD-10-CM

## 2024-10-31 LAB — REF LAB TEST METHOD: NORMAL

## 2024-10-31 RX ORDER — METRONIDAZOLE 500 MG/1
500 TABLET ORAL 2 TIMES DAILY
Qty: 14 TABLET | Refills: 0 | Status: SHIPPED | OUTPATIENT
Start: 2024-10-31

## 2024-11-01 ENCOUNTER — TELEPHONE (OUTPATIENT)
Dept: FAMILY MEDICINE CLINIC | Facility: CLINIC | Age: 54
End: 2024-11-01
Payer: MEDICAID

## 2024-11-01 NOTE — TELEPHONE ENCOUNTER
ADVISED PATIENT OF RESULTS OF PAP, PATIENT HAS PICKED UP THE FLAGYL AND THE CIPRO,  PATIENT HAS CONCERNS AND QUESTIONS ABOUT THE TRICHOMONAS.

## 2024-11-05 ENCOUNTER — OFFICE VISIT (OUTPATIENT)
Dept: FAMILY MEDICINE CLINIC | Facility: CLINIC | Age: 54
End: 2024-11-05
Payer: MEDICAID

## 2024-11-05 VITALS
BODY MASS INDEX: 20.12 KG/M2 | HEART RATE: 130 BPM | HEIGHT: 67 IN | WEIGHT: 128.2 LBS | OXYGEN SATURATION: 99 % | SYSTOLIC BLOOD PRESSURE: 110 MMHG | RESPIRATION RATE: 18 BRPM | DIASTOLIC BLOOD PRESSURE: 80 MMHG | TEMPERATURE: 100 F

## 2024-11-05 DIAGNOSIS — N89.8 VAGINAL DISCHARGE: Primary | ICD-10-CM

## 2024-11-05 PROCEDURE — 1160F RVW MEDS BY RX/DR IN RCRD: CPT | Performed by: FAMILY MEDICINE

## 2024-11-05 PROCEDURE — 1125F AMNT PAIN NOTED PAIN PRSNT: CPT | Performed by: FAMILY MEDICINE

## 2024-11-05 PROCEDURE — 1159F MED LIST DOCD IN RCRD: CPT | Performed by: FAMILY MEDICINE

## 2024-11-05 PROCEDURE — 99213 OFFICE O/P EST LOW 20 MIN: CPT | Performed by: FAMILY MEDICINE

## 2024-11-05 RX ORDER — CIPROFLOXACIN 250 MG/1
250 TABLET, FILM COATED ORAL
COMMUNITY
Start: 2024-10-29 | End: 2024-11-05

## 2024-11-05 NOTE — PROGRESS NOTES
Follow Up Office Visit      Date: 2024   Patient Name: Fatuma Bolton  : 1970   MRN: [id number]     Chief Complaint:    Chief Complaint   Patient presents with    Follow-up     Discuss pap smear results.       History of Present Illness: Fatuma Bolton is a 54 y.o. female who is here today for follow-up of a recent Pap smear that suggested that she may have underlying trichomonas.  Patient was having vaginal discharge and did have a Pap smear obtained that did reveal morphology consistent with trichomonas.  Patient was started on Flagyl and has done well with respect to her symptoms.  She is concerned that he had may be underlying sexually-transmitted disease.  Patient denies any other concerns at present time.  She is tolerating the Flagyl without complaint.  She was recently seen for a urinary tract infection and is currently taking ciprofloxacin without difficulty.  There are no other concerns at present time.  Patient appears to be doing otherwise well.  They have continue with their medications without any side effects.  They have not had any changes in their usual activity, appetite and sleep.  Patient denies any other cardiovascular, respiratory, gastrointestinal, urologic or neurologic complaints.    History of Present Illness         Subjective      Review of Systems:   Review of Systems   Constitutional:  Negative for activity change, appetite change and fatigue.   Respiratory:  Negative for cough, chest tightness, shortness of breath and wheezing.    Cardiovascular:  Negative for chest pain, palpitations and leg swelling.   Gastrointestinal:  Negative for abdominal distention, abdominal pain, blood in stool, constipation, diarrhea, nausea, vomiting, GERD and indigestion.   Genitourinary:  Negative for difficulty urinating, dysuria, flank pain, frequency, hematuria and urgency.   Musculoskeletal:  Negative for arthralgias, back pain, gait problem, joint swelling and myalgias.  "  Neurological:  Negative for dizziness, tremors, seizures, syncope, weakness, light-headedness, numbness, headache and memory problem.   Psychiatric/Behavioral:  Negative for sleep disturbance and depressed mood. The patient is not nervous/anxious.        I have reviewed the patients family history, social history, past medical history, past surgical history and have updated it as appropriate.     Medications:     Current Outpatient Medications:     albuterol sulfate  (90 Base) MCG/ACT inhaler, Inhale 2 puffs Every 6 (Six) Hours As Needed for Wheezing., Disp: 18 g, Rfl: 11    metroNIDAZOLE (Flagyl) 500 MG tablet, Take 1 tablet by mouth 2 (Two) Times a Day., Disp: 14 tablet, Rfl: 0    Allergies:   Allergies   Allergen Reactions    Penicillins Rash       Immunizations:   Immunization History   Administered Date(s) Administered    Pneumococcal Conjugate 20-Valent (PCV20) 06/12/2023    Tdap 06/12/2023        Objective     Physical Exam: Please see above  Vital Signs:   Vitals:    11/05/24 1309   BP: 110/80   BP Location: Left arm   Patient Position: Sitting   Cuff Size: Adult   Pulse: (!) 130   Resp: 18   Temp: 100 °F (37.8 °C)   TempSrc: Temporal   SpO2: 99%   Weight: 58.2 kg (128 lb 3.2 oz)   Height: 170.2 cm (67\")     Body mass index is 20.08 kg/m².  BMI is within normal parameters. No other follow-up for BMI required.       Physical Exam  Vitals and nursing note reviewed.   Constitutional:       Appearance: Normal appearance.   HENT:      Head: Normocephalic and atraumatic.      Nose: Nose normal.      Mouth/Throat:      Pharynx: Oropharynx is clear.   Eyes:      Extraocular Movements: Extraocular movements intact.      Pupils: Pupils are equal, round, and reactive to light.   Neck:      Thyroid: No thyroid mass or thyromegaly.      Trachea: Trachea normal.   Cardiovascular:      Rate and Rhythm: Normal rate and regular rhythm.      Pulses: Normal pulses. No decreased pulses.      Heart sounds: Normal heart " sounds.   Pulmonary:      Effort: Pulmonary effort is normal.      Breath sounds: Normal breath sounds.   Abdominal:      General: Abdomen is flat. Bowel sounds are normal.      Palpations: Abdomen is soft.      Tenderness: There is no abdominal tenderness.   Musculoskeletal:      Cervical back: Neck supple.      Right lower leg: No edema.      Left lower leg: No edema.   Lymphadenopathy:      Cervical: No cervical adenopathy.   Skin:     General: Skin is warm and dry.   Neurological:      General: No focal deficit present.      Mental Status: She is alert and oriented to person, place, and time.      Sensory: Sensation is intact.      Motor: Motor function is intact.      Coordination: Coordination is intact.   Psychiatric:         Attention and Perception: Attention normal.         Mood and Affect: Mood normal.         Speech: Speech normal.         Behavior: Behavior normal.         Procedures    Results:   Labs:   Hemoglobin A1C   Date Value Ref Range Status   10/28/2024 5.90 (H) 4.80 - 5.60 % Final     TSH   Date Value Ref Range Status   10/28/2024 1.590 0.270 - 4.200 uIU/mL Final        POCT Results (if applicable):   Results for orders placed or performed in visit on 10/28/24   CBC (No Diff)    Collection Time: 10/28/24 12:30 PM    Specimen: Arm, Right; Blood   Result Value Ref Range    WBC 8.97 3.40 - 10.80 10*3/mm3    RBC 4.94 3.77 - 5.28 10*6/mm3    Hemoglobin 15.0 12.0 - 15.9 g/dL    Hematocrit 45.8 34.0 - 46.6 %    MCV 92.7 79.0 - 97.0 fL    MCH 30.4 26.6 - 33.0 pg    MCHC 32.8 31.5 - 35.7 g/dL    RDW 11.9 (L) 12.3 - 15.4 %    RDW-SD 40.4 37.0 - 54.0 fl    MPV 10.8 6.0 - 12.0 fL    Platelets 346 140 - 450 10*3/mm3   Comprehensive Metabolic Panel    Collection Time: 10/28/24 12:30 PM    Specimen: Arm, Right; Blood   Result Value Ref Range    Glucose 88 65 - 99 mg/dL    BUN 10 6 - 20 mg/dL    Creatinine 0.78 0.57 - 1.00 mg/dL    Sodium 139 136 - 145 mmol/L    Potassium 4.5 3.5 - 5.2 mmol/L    Chloride 102  98 - 107 mmol/L    CO2 25.3 22.0 - 29.0 mmol/L    Calcium 9.8 8.6 - 10.5 mg/dL    Total Protein 7.4 6.0 - 8.5 g/dL    Albumin 4.7 3.5 - 5.2 g/dL    ALT (SGPT) 10 1 - 33 U/L    AST (SGOT) 16 1 - 32 U/L    Alkaline Phosphatase 94 39 - 117 U/L    Total Bilirubin 0.3 0.0 - 1.2 mg/dL    Globulin 2.7 gm/dL    A/G Ratio 1.7 g/dL    BUN/Creatinine Ratio 12.8 7.0 - 25.0    Anion Gap 11.7 5.0 - 15.0 mmol/L    eGFR 89.8 >60.0 mL/min/1.73   Hemoglobin A1c    Collection Time: 10/28/24 12:30 PM    Specimen: Arm, Right; Blood   Result Value Ref Range    Hemoglobin A1C 5.90 (H) 4.80 - 5.60 %   Lipid Panel    Collection Time: 10/28/24 12:30 PM    Specimen: Arm, Right; Blood   Result Value Ref Range    Total Cholesterol 215 (H) 0 - 200 mg/dL    Triglycerides 130 0 - 150 mg/dL    HDL Cholesterol 49 40 - 60 mg/dL    LDL Cholesterol  143 (H) 0 - 100 mg/dL    VLDL Cholesterol 23 5 - 40 mg/dL    LDL/HDL Ratio 2.86    Vitamin B12    Collection Time: 10/28/24 12:30 PM    Specimen: Arm, Right; Blood   Result Value Ref Range    Vitamin B-12 325 211 - 946 pg/mL   TSH Rfx On Abnormal To Free T4    Collection Time: 10/28/24 12:30 PM    Specimen: Arm, Right; Blood   Result Value Ref Range    TSH 1.590 0.270 - 4.200 uIU/mL   Urinalysis without microscopic (no culture) - Urine, Clean Catch    Collection Time: 10/28/24 12:33 PM    Specimen: Urine, Clean Catch   Result Value Ref Range    Color, UA Dark Yellow (A) Yellow, Straw    Appearance, UA Cloudy (A) Clear    pH, UA 6.0 5.0 - 8.0    Specific Gravity, UA 1.020 1.005 - 1.030    Glucose, UA Negative Negative    Ketones, UA Negative Negative    Bilirubin, UA Negative Negative    Blood, UA Moderate (2+) (A) Negative    Protein, UA Trace (A) Negative    Leuk Esterase, UA Large (3+) (A) Negative    Nitrite, UA Positive (A) Negative    Urobilinogen, UA 1.0 E.U./dL 0.2 - 1.0 E.U./dL   LIQUID-BASED PAP SMEAR, [facility] ([facility])    Collection Time: 10/28/24  3:11 PM    Specimen: ThinPrep Vial   Result  Value Ref Range    Reference Lab Report       [facility]  [address] [place]  [address]  Phone: [phone] or [phone]  Fax: [phone]  Imer Middleton M.D., Medical Director    PATIENT NAME                                     LABORATORY NO.  LORRI MAHER                                [id number]  [id number]                                 AGE                    SEX   SSN              CLIENT REF #  [facility]           54        1970      F     [id number]      [id number]    CARE                                       REQUESTING M.D.           ATTENDING M.D.         COPY TO.  [address]  DATE COLLECTED            DATE RECEIVED          DATE REPORTED  10/28/2024                10/29/2024             10/31/2024    ThinPrep Pap with [facility]    DIAGNOSIS:  Negative for intraepithelial lesion or malignancy    Multiple factors can influence accuracy of  Pap tests; therefore, screening at  regular intervals is necessary for early cancer detection.    COMMENT:     Benign cellular changes associated with inflammation are present.  MICRO-ORGANISMS MORPHOLOGICALLY  CONSISTENT WITH TRICHOMONAS VAGINALIS IS  PRESENT.    SPECIMEN ADEQUACY:  SATISFACTORY FOR EVALUATION  Transformation zone is present.  Partially obscuring inflammation is present.  SOURCE OF SPECIMEN:       CERVICAL  SLIDES:  1  CLINICAL HISTORY:  Routine gynecological examination      CYTOTECHNOLOGIST:             KELLI, CT (ASCP)    CPT CODES:  [id number]         Imaging:   No valid procedures specified.     Measures:   Advanced Care Planning:   Patient does not have an advance directive, information provided.    Smoking Cessation:   Non-smoker.    Assessment / Plan      Assessment/Plan:   Diagnoses and all orders for this visit:    1. Vaginal discharge (Primary)   Patient has had  great improvement of her vaginal discharge with use of Flagyl.  There is some concern that she may have a sexually transmitted disease if the morphology was consistent with trichomonas.  Unfortunately she did not receive a NAAT or PCR prior to treatment.  We have discussed options and since she is sexually active that she will have her partner present for evaluation and testing to assure that the seen organism was not trichomonas.  If she has other complaints prior to her next scheduled pulm she will contact us.      Follow Up:   Return if symptoms worsen or fail to improve.      At [facility], we believe that sharing information builds trust and better relationships. You are receiving this note because you recently visited [facility]. It is possible you will see health information before a provider has talked with you about it. This kind of information can be easy to misunderstand. To help you fully understand what it means for your health, we urge you to discuss this note with your provider.    Alfa Clay MD  [facility]

## 2024-11-07 PROBLEM — Z12.11 ENCOUNTER FOR SCREENING COLONOSCOPY: Status: ACTIVE | Noted: 2024-10-28

## 2024-11-12 ENCOUNTER — READMISSION MANAGEMENT (OUTPATIENT)
Dept: CALL CENTER | Facility: HOSPITAL | Age: 54
End: 2024-11-12
Payer: MEDICAID

## 2024-11-13 ENCOUNTER — TRANSITIONAL CARE MANAGEMENT TELEPHONE ENCOUNTER (OUTPATIENT)
Dept: CALL CENTER | Facility: HOSPITAL | Age: 54
End: 2024-11-13
Payer: MEDICAID

## 2024-11-13 ENCOUNTER — OFFICE VISIT (OUTPATIENT)
Dept: FAMILY MEDICINE CLINIC | Facility: CLINIC | Age: 54
End: 2024-11-13
Payer: MEDICAID

## 2024-11-13 VITALS
HEIGHT: 67 IN | DIASTOLIC BLOOD PRESSURE: 94 MMHG | WEIGHT: 127 LBS | SYSTOLIC BLOOD PRESSURE: 130 MMHG | BODY MASS INDEX: 19.93 KG/M2 | TEMPERATURE: 98 F | OXYGEN SATURATION: 98 % | HEART RATE: 137 BPM | RESPIRATION RATE: 18 BRPM

## 2024-11-13 DIAGNOSIS — A59.01 TRICHOMONAS VAGINITIS: Primary | ICD-10-CM

## 2024-11-13 PROCEDURE — 1160F RVW MEDS BY RX/DR IN RCRD: CPT | Performed by: FAMILY MEDICINE

## 2024-11-13 PROCEDURE — 1159F MED LIST DOCD IN RCRD: CPT | Performed by: FAMILY MEDICINE

## 2024-11-13 PROCEDURE — 99213 OFFICE O/P EST LOW 20 MIN: CPT | Performed by: FAMILY MEDICINE

## 2024-11-13 PROCEDURE — 1125F AMNT PAIN NOTED PAIN PRSNT: CPT | Performed by: FAMILY MEDICINE

## 2024-11-13 NOTE — PROGRESS NOTES
Follow Up Office Visit      Date: 2024   Patient Name: Fatuma Bolton  : 1970   MRN: [id number]     Chief Complaint:    Chief Complaint   Patient presents with    Follow-up       History of Present Illness: Fatuma Bolton is a 54 y.o. female who is here today for for follow-up of her recent clinic appointment where she was noted to be positive for trichomonas vaginalis.  She has completed the course of antibiotics and she is now symptom-free.  She and her sexual partner is currently being treated.  She feels like she is doing well at present time.  She has not had any fever or chills.  She has no vaginal symptoms and denies any dysuria.  She did tolerate the medication well without any complaints.  Patient has otherwise been doing well.  She recently had surgery of her cervical spine and is recovering relatively well.  She has no other concerns at present time.  Patient appears to be doing otherwise well.  They have continue with their medications without any side effects.  They have not had any changes in their usual activity, appetite and sleep.  Patient denies any other cardiovascular, respiratory, gastrointestinal, urologic or neurologic complaints.    History of Present Illness         Subjective      Review of Systems:   Review of Systems   Constitutional:  Negative for activity change, appetite change and fatigue.   Respiratory:  Negative for cough, chest tightness, shortness of breath and wheezing.    Cardiovascular:  Negative for chest pain, palpitations and leg swelling.   Gastrointestinal:  Negative for abdominal distention, abdominal pain, blood in stool, constipation, diarrhea, nausea, vomiting, GERD and indigestion.   Genitourinary:  Negative for difficulty urinating, dysuria, flank pain, frequency, hematuria and urgency.   Musculoskeletal:  Negative for arthralgias, back pain, gait problem, joint swelling and myalgias.   Neurological:  Negative for dizziness, tremors, seizures,  "syncope, weakness, light-headedness, numbness, headache and memory problem.   Psychiatric/Behavioral:  Negative for sleep disturbance and depressed mood. The patient is not nervous/anxious.        I have reviewed the patients family history, social history, past medical history, past surgical history and have updated it as appropriate.     Medications:     Current Outpatient Medications:     albuterol sulfate  (90 Base) MCG/ACT inhaler, Inhale 2 puffs Every 6 (Six) Hours As Needed for Wheezing., Disp: 18 g, Rfl: 11    Allergies:   Allergies   Allergen Reactions    Penicillins Rash       Immunizations:   Immunization History   Administered Date(s) Administered    Pneumococcal Conjugate 20-Valent (PCV20) 06/12/2023    Tdap 06/12/2023        Objective     Physical Exam: Please see above  Vital Signs:   Vitals:    11/13/24 1526   BP: 130/94   BP Location: Left arm   Patient Position: Sitting   Cuff Size: Adult   Pulse: (!) 137   Resp: 18   Temp: 98 °F (36.7 °C)   TempSrc: Temporal   SpO2: 98%   Weight: 57.6 kg (127 lb)   Height: 170.2 cm (67.01\")     Body mass index is 19.89 kg/m².  BMI is within normal parameters. No other follow-up for BMI required.       Physical Exam  Vitals and nursing note reviewed.   Constitutional:       Appearance: Normal appearance.   HENT:      Head: Normocephalic and atraumatic.      Nose: Nose normal.      Mouth/Throat:      Pharynx: Oropharynx is clear.   Eyes:      Extraocular Movements: Extraocular movements intact.      Pupils: Pupils are equal, round, and reactive to light.   Neck:      Thyroid: No thyroid mass or thyromegaly.      Trachea: Trachea normal.   Cardiovascular:      Rate and Rhythm: Normal rate and regular rhythm.      Pulses: Normal pulses. No decreased pulses.      Heart sounds: Normal heart sounds.   Pulmonary:      Effort: Pulmonary effort is normal.      Breath sounds: Normal breath sounds.   Abdominal:      General: Abdomen is flat. Bowel sounds are normal.    "   Palpations: Abdomen is soft.      Tenderness: There is no abdominal tenderness.   Musculoskeletal:      Cervical back: Neck supple.      Right lower leg: No edema.      Left lower leg: No edema.   Lymphadenopathy:      Cervical: No cervical adenopathy.   Skin:     General: Skin is warm and dry.   Neurological:      General: No focal deficit present.      Mental Status: She is alert and oriented to person, place, and time.      Sensory: Sensation is intact.      Motor: Motor function is intact.      Coordination: Coordination is intact.   Psychiatric:         Attention and Perception: Attention normal.         Mood and Affect: Mood normal.         Speech: Speech normal.         Behavior: Behavior normal.         Procedures    Results:   Labs:   Hemoglobin A1C   Date Value Ref Range Status   10/28/2024 5.90 (H) 4.80 - 5.60 % Final     TSH   Date Value Ref Range Status   10/28/2024 1.590 0.270 - 4.200 uIU/mL Final        POCT Results (if applicable):   Results for orders placed or performed in visit on 10/28/24   CBC (No Diff)    Collection Time: 10/28/24 12:30 PM    Specimen: Arm, Right; Blood   Result Value Ref Range    WBC 8.97 3.40 - 10.80 10*3/mm3    RBC 4.94 3.77 - 5.28 10*6/mm3    Hemoglobin 15.0 12.0 - 15.9 g/dL    Hematocrit 45.8 34.0 - 46.6 %    MCV 92.7 79.0 - 97.0 fL    MCH 30.4 26.6 - 33.0 pg    MCHC 32.8 31.5 - 35.7 g/dL    RDW 11.9 (L) 12.3 - 15.4 %    RDW-SD 40.4 37.0 - 54.0 fl    MPV 10.8 6.0 - 12.0 fL    Platelets 346 140 - 450 10*3/mm3   Comprehensive Metabolic Panel    Collection Time: 10/28/24 12:30 PM    Specimen: Arm, Right; Blood   Result Value Ref Range    Glucose 88 65 - 99 mg/dL    BUN 10 6 - 20 mg/dL    Creatinine 0.78 0.57 - 1.00 mg/dL    Sodium 139 136 - 145 mmol/L    Potassium 4.5 3.5 - 5.2 mmol/L    Chloride 102 98 - 107 mmol/L    CO2 25.3 22.0 - 29.0 mmol/L    Calcium 9.8 8.6 - 10.5 mg/dL    Total Protein 7.4 6.0 - 8.5 g/dL    Albumin 4.7 3.5 - 5.2 g/dL    ALT (SGPT) 10 1 - 33 U/L     AST (SGOT) 16 1 - 32 U/L    Alkaline Phosphatase 94 39 - 117 U/L    Total Bilirubin 0.3 0.0 - 1.2 mg/dL    Globulin 2.7 gm/dL    A/G Ratio 1.7 g/dL    BUN/Creatinine Ratio 12.8 7.0 - 25.0    Anion Gap 11.7 5.0 - 15.0 mmol/L    eGFR 89.8 >60.0 mL/min/1.73   Hemoglobin A1c    Collection Time: 10/28/24 12:30 PM    Specimen: Arm, Right; Blood   Result Value Ref Range    Hemoglobin A1C 5.90 (H) 4.80 - 5.60 %   Lipid Panel    Collection Time: 10/28/24 12:30 PM    Specimen: Arm, Right; Blood   Result Value Ref Range    Total Cholesterol 215 (H) 0 - 200 mg/dL    Triglycerides 130 0 - 150 mg/dL    HDL Cholesterol 49 40 - 60 mg/dL    LDL Cholesterol  143 (H) 0 - 100 mg/dL    VLDL Cholesterol 23 5 - 40 mg/dL    LDL/HDL Ratio 2.86    Vitamin B12    Collection Time: 10/28/24 12:30 PM    Specimen: Arm, Right; Blood   Result Value Ref Range    Vitamin B-12 325 211 - 946 pg/mL   TSH Rfx On Abnormal To Free T4    Collection Time: 10/28/24 12:30 PM    Specimen: Arm, Right; Blood   Result Value Ref Range    TSH 1.590 0.270 - 4.200 uIU/mL   Urinalysis without microscopic (no culture) - Urine, Clean Catch    Collection Time: 10/28/24 12:33 PM    Specimen: Urine, Clean Catch   Result Value Ref Range    Color, UA Dark Yellow (A) Yellow, Straw    Appearance, UA Cloudy (A) Clear    pH, UA 6.0 5.0 - 8.0    Specific Gravity, UA 1.020 1.005 - 1.030    Glucose, UA Negative Negative    Ketones, UA Negative Negative    Bilirubin, UA Negative Negative    Blood, UA Moderate (2+) (A) Negative    Protein, UA Trace (A) Negative    Leuk Esterase, UA Large (3+) (A) Negative    Nitrite, UA Positive (A) Negative    Urobilinogen, UA 1.0 E.U./dL 0.2 - 1.0 E.U./dL   LIQUID-BASED PAP SMEAR, [facility] ([facility])    Collection Time: 10/28/24  3:11 PM    Specimen: ThinPrep Vial   Result Value Ref Range    Reference Lab Report       [facility]  [address] [place]  [address]  Phone: [phone] or [phone]  Fax:  [phone]  Imer Middleton M.D., Medical Director    PATIENT NAME                                     LABORATORY NO.  [id number]   LORRI JACOBS                                [id number]  [id number]                                 AGE                    SEX   SSN              CLIENT REF #  [facility]           54        1970      F     [id number]      [id number]    CARE                                       REQUESTING M.D.           ATTENDING M.D.         COPY TO[address]  DATE COLLECTED            DATE RECEIVED          DATE REPORTED  10/28/2024                10/29/2024             10/31/2024    ThinPrep Pap with [facility] Imaging    DIAGNOSIS:  Negative for intraepithelial lesion or malignancy    Multiple factors can influence accuracy of  Pap tests; therefore, screening at  regular intervals is necessary for early cancer detection.    COMMENT:     Benign cellular changes associated with inflammation are present.  MICRO-ORGANISMS MORPHOLOGICALLY  CONSISTENT WITH TRICHOMONAS VAGINALIS IS  PRESENT.    SPECIMEN ADEQUACY:  SATISFACTORY FOR EVALUATION  Transformation zone is present.  Partially obscuring inflammation is present.  SOURCE OF SPECIMEN:       CERVICAL  SLIDES:  1  CLINICAL HISTORY:  Routine gynecological examination      CYTOTECHNOLOGIST:             VERITO PEREIRA (ASCP)    CPT CODES:  [id number]         Imaging:   No valid procedures specified.     Measures:   Advanced Care Planning:   Patient does not have an advance directive, information provided.    Smoking Cessation:   Non-smoker.    Assessment / Plan      Assessment/Plan:     1. Trichomonas vaginitis (Primary)  Patient is symptom-free at present time.  She has completed her course of antibiotics and her sexual partner is doing the same at present time.  We had a long discussion of the diagnoses and all orders for this visit:  "Possible causes of trichomonas and that it is considered a sexually transmitted disease.  She understands that he has a possibility that you could acquire this from fomite's or \"sex toys\" but she denies usage excetra.  She is uncertain of how she contracted this and does have concern about her .  We have given her the information from the [facility] website as well as other possible sources.  She will return to clinic in the next 2 weeks for a repeat urine for \"test of cure\".  She has been advised her  will need to do the same.  -     Chlamydia trachomatis, Neisseria gonorrhoeae, Trichomonas vaginalis, PCR - Urine, Vagina; Future        Follow Up:   Return if symptoms worsen or fail to improve.      At [facility], we believe that sharing information builds trust and better relationships. You are receiving this note because you recently visited [facility]. It is possible you will see health information before a provider has talked with you about it. This kind of information can be easy to misunderstand. To help you fully understand what it means for your health, we urge you to discuss this note with your provider.    Alfa Clay MD  [facility]       "

## 2024-11-13 NOTE — OUTREACH NOTE
Call Center TCM Note      Flowsheet Row Responses   [facility] patient discharged from? Non-BH  [D/c'd from [facility]]   Does the patient have one of the following disease processes/diagnoses(primary or secondary)? General Surgery   TCM attempt successful? Yes  [Patient completed PCP follow up today. This appt satisfies TCM guidelines]   Call end time 1540   Discharge diagnosis Spinal stenosis in cervical region, C6-7ACDF WITH REMOVAL OF PREVIOUS C4-6 HARDWARE   TCM call completed? Yes   Wrap up additional comments Patient completed PCP follow up today.   Call end time 1540   Would this patient benefit from a Referral to Amb Social Work? No   Is the patient interested in additional calls from an ambulatory ? No            Felisa Lockwood RN    11/13/2024, 15:48 EST

## 2024-11-13 NOTE — OUTREACH NOTE
Prep Survey      Flowsheet Row Responses   [facility] facility patient discharged from? Non-BH   Is LACE score < 7 ? Non-BH Discharge   Eligibility [facility]   Date of Admission 11/11/24   Date of Discharge 11/12/24   Discharge Disposition Home or Self Care   Discharge diagnosis Spinal stenosis in cervical region, C6-7ACDF WITH REMOVAL OF PREVIOUS C4-6 HARDWARE   Does the patient have one of the following disease processes/diagnoses(primary or secondary)? General Surgery   Does the patient have Home health ordered? No   Prep survey completed? Yes            Marilynn Lao Registered Nurse

## 2024-12-05 ENCOUNTER — LAB (OUTPATIENT)
Dept: FAMILY MEDICINE CLINIC | Facility: CLINIC | Age: 54
End: 2024-12-05
Payer: MEDICAID

## 2024-12-05 DIAGNOSIS — A59.01 TRICHOMONAS VAGINITIS: ICD-10-CM

## 2024-12-09 ENCOUNTER — OFFICE VISIT (OUTPATIENT)
Dept: FAMILY MEDICINE CLINIC | Facility: CLINIC | Age: 54
End: 2024-12-09
Payer: MEDICAID

## 2024-12-09 ENCOUNTER — LAB (OUTPATIENT)
Dept: FAMILY MEDICINE CLINIC | Facility: CLINIC | Age: 54
End: 2024-12-09
Payer: MEDICAID

## 2024-12-09 VITALS
WEIGHT: 136 LBS | DIASTOLIC BLOOD PRESSURE: 60 MMHG | HEIGHT: 67 IN | OXYGEN SATURATION: 98 % | SYSTOLIC BLOOD PRESSURE: 104 MMHG | HEART RATE: 97 BPM | TEMPERATURE: 98 F | RESPIRATION RATE: 18 BRPM | BODY MASS INDEX: 21.35 KG/M2

## 2024-12-09 DIAGNOSIS — A59.9 TRICHOMONAS VAGINALIS INFECTION: ICD-10-CM

## 2024-12-09 DIAGNOSIS — A59.9 TRICHOMONAS VAGINALIS INFECTION: Primary | ICD-10-CM

## 2024-12-09 LAB
HCV AB SER QL: NORMAL
HIV 1+2 AB+HIV1 P24 AG SERPL QL IA: NORMAL

## 2024-12-09 PROCEDURE — 99213 OFFICE O/P EST LOW 20 MIN: CPT | Performed by: PHYSICIAN ASSISTANT

## 2024-12-09 PROCEDURE — 87591 N.GONORRHOEAE DNA AMP PROB: CPT | Performed by: PHYSICIAN ASSISTANT

## 2024-12-09 PROCEDURE — G0432 EIA HIV-1/HIV-2 SCREEN: HCPCS | Performed by: PHYSICIAN ASSISTANT

## 2024-12-09 PROCEDURE — 86696 HERPES SIMPLEX TYPE 2 TEST: CPT | Performed by: PHYSICIAN ASSISTANT

## 2024-12-09 PROCEDURE — 1125F AMNT PAIN NOTED PAIN PRSNT: CPT | Performed by: PHYSICIAN ASSISTANT

## 2024-12-09 PROCEDURE — 87491 CHLMYD TRACH DNA AMP PROBE: CPT | Performed by: PHYSICIAN ASSISTANT

## 2024-12-09 PROCEDURE — 86803 HEPATITIS C AB TEST: CPT | Performed by: PHYSICIAN ASSISTANT

## 2024-12-09 PROCEDURE — 86592 SYPHILIS TEST NON-TREP QUAL: CPT | Performed by: PHYSICIAN ASSISTANT

## 2024-12-09 PROCEDURE — 86695 HERPES SIMPLEX TYPE 1 TEST: CPT | Performed by: PHYSICIAN ASSISTANT

## 2024-12-09 PROCEDURE — 36415 COLL VENOUS BLD VENIPUNCTURE: CPT | Performed by: FAMILY MEDICINE

## 2024-12-09 NOTE — PROGRESS NOTES
Follow Up Office Visit      Date: 2024   Patient Name: Fatuma Bolton  : 1970   MRN: [id number]     Chief Complaint:    Chief Complaint   Patient presents with    Follow-up    Personal Problem       History of Present Illness: Fatuma Bolton is a 54 y.o. female who is here today for    History of Present Illness  The patient is a 54-year-old female who presents for evaluation of other sexually transmitted diseases after diagnosis.    She reports that her recent Pap smear tested positive for trichomonas. She is currently not experiencing any discomfort and is not taking any pain medication.    She is seeking further testing for sexually transmitted diseases, including HIV, syphilis, and gonorrhea. She mentions that she has not been sexually active recently and initially thought her symptoms were due to a urinary tract infection. She expresses concern about potential exposure to sexually transmitted diseases from her partner, who she suspects may have been unfaithful. Additionally, she mentions hearing rumors about a woman in her community who is a drug user and is worried about potential exposure from her.   .    Subjective      Review of Systems:   Review of Systems   Constitutional: Negative.    HENT: Negative.     Respiratory: Negative.     Genitourinary: Negative.        I have reviewed the patients family history, social history, past medical history, past surgical history and have updated it as appropriate.     Medications:     Current Outpatient Medications:     albuterol sulfate  (90 Base) MCG/ACT inhaler, Inhale 2 puffs Every 6 (Six) Hours As Needed for Wheezing., Disp: 18 g, Rfl: 11    Allergies:   Allergies   Allergen Reactions    Penicillins Rash       Objective     Physical Exam: Please see above  Vital Signs:   Vitals:    24 1136   BP: 104/60   BP Location: Right arm   Patient Position: Sitting   Cuff Size: Adult   Pulse: 97   Resp: 18   Temp: 98 °F (36.7 °C)   TempSrc:  "Temporal   SpO2: 98%   Weight: 61.7 kg (136 lb)   Height: 170.2 cm (67.01\")     Body mass index is 21.3 kg/m².  Facility age limit for growth %flip is 20 years.  BMI is within normal parameters. No other follow-up for BMI required.       Physical Exam  Vitals and nursing note reviewed.   Constitutional:       General: She is not in acute distress.     Appearance: Normal appearance. She is not ill-appearing or toxic-appearing.   Neurological:      General: No focal deficit present.      Mental Status: She is alert and oriented to person, place, and time.   Psychiatric:         Mood and Affect: Mood normal.         Behavior: Behavior normal.         Thought Content: Thought content normal.         Judgment: Judgment normal.       Procedures    Assessment / Plan      Assessment/Plan:   1. Trichomonas vaginalis infection  Given the patient's diagnosis of trichomonas, it is crucial to rule out other sexually transmitted infections. A comprehensive panel of tests will be conducted, including urine and blood tests for sexually transmitted diseases such as HIV, syphilis, and gonorrhea. A vaginal swab will be taken to check for trichomonas, yeast, and other potential infections. Additionally, a test for hepatitis C will be performed. The patient reports no new symptoms and is feeling better. If the trichomonas test returns positive, further treatment will be considered.  - Chlamydia trachomatis, Neisseria gonorrhoeae, PCR - Urine, Urine, Random Void; Future  - HIV-1/O/2 ANTIGEN/ANTIBODY, 4TH GENERATION; Future  - HSV 1 and 2-Specific Ab, IgG; Future  - RPR; Future  - CANDIDA/TRICHOMONAS PANEL, P&C-JULIO CÉSAR,CR,MAD(C.GLABRATA,C.ALBICANS,C.TROPICALIS,C.PARAPSILOSIS,T.VAGINALIS) - , Urethra; Future  - Hepatitis C Antibody; Future     Assessment & Plan  1. Trichomonas.  Given the patient's diagnosis of trichomonas, it is crucial to rule out other sexually transmitted infections. A comprehensive panel of tests will be conducted, " including urine and blood tests for sexually transmitted diseases such as HIV, syphilis, and gonorrhea. A vaginal swab will be taken to check for trichomonas, yeast, and other potential infections. Additionally, a test for hepatitis C will be performed. The patient reports no new symptoms and is feeling better. If the trichomonas test returns positive, further treatment will be considered.    2. HIV Screening.  A blood test for HIV will be conducted to rule out any potential infection due to the patient's concerns about her partner's infidelity and the associated risk factors.    3. Syphilis Screening.  A blood test for syphilis will be performed as part of the comprehensive STI panel.    4. Gonorrhea Screening.  A urine test for gonorrhea will be conducted to rule out this sexually transmitted infection.    5. Herpes Screening.  A blood test for herpes will be included in the STI panel to ensure comprehensive screening.    6. Hepatitis C Screening.  Given the potential risk of exposure to intravenous drug use, a hepatitis C test will be performed. The last test was conducted in June of the previous year, and the patient's circumstances have changed since then.         Follow Up:   No follow-ups on file.      At [facility], we believe that sharing information builds trust and better relationships. You are receiving this note because you recently visited [facility]. It is possible you will see health information before a provider has talked with you about it. This kind of information can be easy to misunderstand. To help you fully understand what it means for your health, we urge you to discuss this note with your provider.    Rachel Clay PA-C  [facility]

## 2024-12-10 ENCOUNTER — TELEPHONE (OUTPATIENT)
Dept: FAMILY MEDICINE CLINIC | Facility: CLINIC | Age: 54
End: 2024-12-10
Payer: MEDICAID

## 2024-12-10 LAB — RPR SER QL: NORMAL

## 2024-12-10 NOTE — TELEPHONE ENCOUNTER
Contacted the patient to discuss blood work. The patient expressed good understanding and appreciation. No questions or concerns.

## 2024-12-10 NOTE — TELEPHONE ENCOUNTER
----- Message from Rachel Clay sent at 12/10/2024 12:36 PM EST -----  Blood test for other sexually transmitted diseases are all negative.  The swabs have not been resulted to be yet I will get that information to you as fast as I can.

## 2024-12-11 LAB
HSV1 IGG SERPL QL IA: REACTIVE
HSV2 IGG SERPL QL IA: NON REACTIVE

## 2024-12-12 LAB
C TRACH RRNA SPEC QL NAA+PROBE: NEGATIVE
N GONORRHOEA RRNA SPEC QL NAA+PROBE: NEGATIVE

## 2024-12-13 LAB — REF LAB TEST METHOD: NORMAL

## 2024-12-17 ENCOUNTER — TELEPHONE (OUTPATIENT)
Dept: SURGERY | Facility: CLINIC | Age: 54
End: 2024-12-17
Payer: MEDICAID

## 2025-01-13 ENCOUNTER — TELEPHONE (OUTPATIENT)
Dept: SURGERY | Facility: CLINIC | Age: 55
End: 2025-01-13
Payer: MEDICAID

## 2025-01-13 NOTE — TELEPHONE ENCOUNTER
PATIENT CALLED IN AND NEEDS TO CANCEL COLONOSCOPY FOR TOMORROW 1/14/25 SHE DOES NOT HAVE A RIDE AND WOULD LIKE SOMEONE TO CALL HER BACK SO SHE CAN RESCHEDULE WHEN SHE CAN GET A RIDE.

## 2025-01-15 NOTE — TELEPHONE ENCOUNTER
Again LVM for patient to return call to reschedule colonoscopy with Dr. Augustin. This is 3rd attempt to contact patient.

## 2025-01-29 ENCOUNTER — OFFICE VISIT (OUTPATIENT)
Dept: FAMILY MEDICINE CLINIC | Facility: CLINIC | Age: 55
End: 2025-01-29
Payer: MEDICAID

## 2025-01-29 VITALS
HEART RATE: 70 BPM | WEIGHT: 134 LBS | TEMPERATURE: 98 F | OXYGEN SATURATION: 98 % | RESPIRATION RATE: 18 BRPM | DIASTOLIC BLOOD PRESSURE: 70 MMHG | BODY MASS INDEX: 21.03 KG/M2 | SYSTOLIC BLOOD PRESSURE: 112 MMHG | HEIGHT: 67 IN

## 2025-01-29 DIAGNOSIS — M54.6 THORACIC SPINE PAIN: ICD-10-CM

## 2025-01-29 DIAGNOSIS — M54.50 LUMBAR PAIN: ICD-10-CM

## 2025-01-29 DIAGNOSIS — Z28.21 IMMUNIZATION DECLINED: ICD-10-CM

## 2025-01-29 DIAGNOSIS — F17.210 CIGARETTE NICOTINE DEPENDENCE WITHOUT COMPLICATION: ICD-10-CM

## 2025-01-29 DIAGNOSIS — M54.2 NECK PAIN: Primary | ICD-10-CM

## 2025-01-29 DIAGNOSIS — R73.09 ELEVATED HEMOGLOBIN A1C: ICD-10-CM

## 2025-01-29 PROCEDURE — 1125F AMNT PAIN NOTED PAIN PRSNT: CPT | Performed by: FAMILY MEDICINE

## 2025-01-29 PROCEDURE — 1159F MED LIST DOCD IN RCRD: CPT | Performed by: FAMILY MEDICINE

## 2025-01-29 PROCEDURE — 99214 OFFICE O/P EST MOD 30 MIN: CPT | Performed by: FAMILY MEDICINE

## 2025-01-29 PROCEDURE — 1160F RVW MEDS BY RX/DR IN RCRD: CPT | Performed by: FAMILY MEDICINE

## 2025-01-29 RX ORDER — BACLOFEN 20 MG/1
20 TABLET ORAL 3 TIMES DAILY
Qty: 90 TABLET | Refills: 0 | Status: SHIPPED | OUTPATIENT
Start: 2025-01-29

## 2025-01-29 NOTE — PROGRESS NOTES
Follow Up Office Visit      Date: 2025   Patient Name: Fatuma Bolton  : 1970   MRN: [id number]     Chief Complaint:    Chief Complaint   Patient presents with    Neck Pain    Back Pain     Fell on ice, middle of back and low back        History of Present Illness: Fatuma Bolton is a 54 y.o. female who is here today for follow-up.    History of Present Illness  The patient is a 54-year-old female who presents for evaluation of neck pain.    She experienced a forward fall on ice last Friday, during which she managed to brace herself with her wrist and did not hit her head. Initially, she did not seek immediate medical attention as she did not experience any head trauma. However, she reported feeling kodak from the incident. She has been experiencing discomfort in her lower back since the fall, which has now escalated to a sensation of pressure in the middle of her spine when bending over. This new symptom is causing her distress. She reports no radiating pain down her legs or arms but notes that the pain intensifies when she bends over, shooting up into her head. She has not yet consulted with Dr. Mora regarding this issue. She has not been prescribed any muscle relaxants and prefers to avoid medication unless absolutely necessary. She also reports that smoking CBD provides some relief for her back pain. She has been managing her pain with Tylenol and was advised against using ibuprofen due to its potential impact on her neck's healing process. She had some leftover medication from a previous surgery, which she used to alleviate the pain, but it only induced sleep without providing significant relief. She was previously prescribed cyclobenzaprine by the emergency room 2 years ago.    She has never undergone a colonoscopy. She had a mammogram on 2025, which yielded normal results. She also had a CT scan of her lungs on 10/25/2024, which was unremarkable. She declines vaccines.    SOCIAL  HISTORY  The patient smokes CBD to help ease back pain.    MEDICATIONS  Current: Tylenol  Past: Cyclobenzaprine    IMMUNIZATIONS  The patient declined the pneumonia vaccine.     Patient appears to be doing otherwise well.  They have continue with their medications without any side effects.  They have not had any changes in their usual activity, appetite and sleep.  Patient denies any other cardiovascular, respiratory, gastrointestinal, urologic or neurologic complaints.    Subjective      Review of Systems:   Review of Systems   Constitutional:  Negative for activity change, appetite change and fatigue.   Respiratory:  Negative for cough, chest tightness, shortness of breath and wheezing.    Cardiovascular:  Negative for chest pain, palpitations and leg swelling.   Gastrointestinal:  Negative for abdominal distention, abdominal pain, blood in stool, constipation, diarrhea, nausea, vomiting, GERD and indigestion.   Genitourinary:  Negative for difficulty urinating, dysuria, flank pain, frequency, hematuria and urgency.   Musculoskeletal:  Positive for arthralgias, back pain and neck pain. Negative for gait problem, joint swelling and myalgias.   Neurological:  Negative for dizziness, tremors, seizures, syncope, weakness, light-headedness, numbness, headache and memory problem.   Psychiatric/Behavioral:  Negative for sleep disturbance and depressed mood. The patient is not nervous/anxious.        I have reviewed the patients family history, social history, past medical history, past surgical history and have updated it as appropriate.     Medications:     Current Outpatient Medications:     albuterol sulfate  (90 Base) MCG/ACT inhaler, Inhale 2 puffs Every 6 (Six) Hours As Needed for Wheezing., Disp: 18 g, Rfl: 11    baclofen (LIORESAL) 20 MG tablet, Take 1 tablet by mouth 3 (Three) Times a Day., Disp: 90 tablet, Rfl: 0    Allergies:   Allergies   Allergen Reactions    Penicillins Rash       Immunizations:  "  Immunization History   Administered Date(s) Administered    Pneumococcal Conjugate 20-Valent (PCV20) 06/12/2023    Tdap 06/12/2023        Objective     Physical Exam: Please see above  Vital Signs:   Vitals:    01/29/25 0851   BP: 112/70   BP Location: Left arm   Patient Position: Sitting   Cuff Size: Adult   Pulse: 70   Resp: 18   Temp: 98 °F (36.7 °C)   TempSrc: Temporal   SpO2: 98%   Weight: 60.8 kg (134 lb)   Height: 170.2 cm (67.01\")     Body mass index is 20.98 kg/m².  BMI is within normal parameters. No other follow-up for BMI required.       Physical Exam  Vitals and nursing note reviewed.   Constitutional:       Appearance: Normal appearance.   HENT:      Head: Normocephalic and atraumatic.      Nose: Nose normal.      Mouth/Throat:      Pharynx: Oropharynx is clear.   Eyes:      Extraocular Movements: Extraocular movements intact.      Pupils: Pupils are equal, round, and reactive to light.   Neck:      Thyroid: No thyroid mass or thyromegaly.      Trachea: Trachea normal.   Cardiovascular:      Rate and Rhythm: Normal rate and regular rhythm.      Pulses: Normal pulses. No decreased pulses.      Heart sounds: Normal heart sounds.   Pulmonary:      Effort: Pulmonary effort is normal.      Breath sounds: Normal breath sounds.   Abdominal:      General: Abdomen is flat. Bowel sounds are normal.      Palpations: Abdomen is soft.      Tenderness: There is no abdominal tenderness.   Musculoskeletal:      Cervical back: Pain with movement and muscular tenderness present. No spinous process tenderness. Decreased range of motion.      Right lower leg: No edema.      Left lower leg: No edema.   Lymphadenopathy:      Cervical: No cervical adenopathy.   Skin:     General: Skin is warm and dry.   Neurological:      General: No focal deficit present.      Mental Status: She is alert and oriented to person, place, and time.      Sensory: Sensation is intact.      Motor: Motor function is intact.      Coordination: " Coordination is intact.   Psychiatric:         Attention and Perception: Attention normal.         Mood and Affect: Mood normal.         Speech: Speech normal.         Behavior: Behavior normal.         Procedures    Results:   Labs:   Hemoglobin A1C   Date Value Ref Range Status   10/28/2024 5.90 (H) 4.80 - 5.60 % Final     TSH   Date Value Ref Range Status   10/28/2024 1.590 0.270 - 4.200 uIU/mL Final        POCT Results (if applicable):   Results for orders placed or performed in visit on 12/09/24   HIV-1/O/2 ANTIGEN/ANTIBODY, 4TH GENERATION    Collection Time: 12/09/24 12:08 PM    Specimen: Arm, Right; Blood   Result Value Ref Range    HIV DUO Non-Reactive Non-Reactive   HSV 1 and 2-Specific Ab, IgG    Collection Time: 12/09/24 12:08 PM    Specimen: Arm, Right; Blood   Result Value Ref Range    HSV 1 IgG, Type Specific Reactive (A) Non Reactive    HSV 2 IgG, Type Spec Non Reactive Non Reactive   RPR    Collection Time: 12/09/24 12:08 PM    Specimen: Arm, Right; Blood   Result Value Ref Range    RPR Non-Reactive Non-Reactive   Hepatitis C Antibody    Collection Time: 12/09/24 12:08 PM    Specimen: Arm, Right; Blood   Result Value Ref Range    Hepatitis C Ab Non-Reactive Non-Reactive   Chlamydia trachomatis, Neisseria gonorrhoeae, PCR - Urine, Urine, Clean Catch    Collection Time: 12/09/24 12:10 PM    Specimen: Urine, Clean Catch   Result Value Ref Range    Chlamydia trachomatis, NIKO Negative Negative    Neisseria gonorrhoeae, NIKO Negative Negative   CANDIDA/TRICHOMONAS PANEL, P&C-JULIO CÉSAR,CR,MAD(C.GLABRATA,C.ALBICANS,C.TROPICALIS,C.PARAPSILOSIS,T.VAGINALIS) - , Vagina    Collection Time: 12/09/24 12:10 PM    Specimen: Vagina   Result Value Ref Range    Reference Lab Report       [facility]  [address] [place] [address]  Phone: [phone] or [phone]  Fax: [phone]  Imer Middleton M.D., Medical Director    PATIENT NAME                           LABORATORY NO.  [id number]   LORRI JACOBSMyron                      [id number]  [id number]                         AGE              SEX  SSN           CLIENT REF #  [facility]   54      1970  F    [id number]   [id number]    CARE                               REQUESTING M.D.     ATTENDING M.D.     COPY TO.  [address] [place], [place] [address] [id number]                CRISTINA  DATE COLLECTED      DATE RECEIVED      DATE REPORTED  2024          12/10/2024         2024    Molecular Only      CLINICAL HISTORY:  Trichomonas vaginalis infection    Candida / Trichomonas  CANDIDA GLABRATA: Negative  CANDIDA SPP: Negative  TRICHOMONAS VAGINALIS: Negative    The Aptima CV/TV assay is an in vitro nucleic acid amplification test  for the  detection of RNA from microorganisms associated with vulvovaginal candiasis  and trichomoniasis. The assay utilizes real time transcription mediated  amplification to detect the following organisms: Candida species (C. alibicans,  C. tropicalis, C. parapsilosis, C. dubliniensis) Candida glabrata, and  Trichomonas vaginalis.      CPT CODES:  87481x2, 69032         Imaging:   No valid procedures specified.     Measures:   Advanced Care Planning:   Patient does not have an advance directive, information provided.    Smoking Cessation:   Smoker.  Less than 3 minutes was spent with instruction.  Patient will try to cut down.    Assessment / Plan      Assessment/Plan:   Diagnoses and all orders for this visit:    1. Neck pain (Primary)  Patient does have neck pain after she fell on the ice.  It does appear that she does have tenderness of the paraspinal muscles but since she has recent fusion we will obtain x-rays to assure us there is no underlying pathology.  We will start muscle relaxers and may consider use of heating pad with possible physical therapy.  If she does have radiculopathy or any abnormality on the x-ray we will make referral to her neurosurgeon.  -      XR Spine Cervical 2 or 3 View; Future  -     baclofen (LIORESAL) 20 MG tablet; Take 1 tablet by mouth 3 (Three) Times a Day.  Dispense: 90 tablet; Refill: 0    2. Elevated hemoglobin A1c   Patient does have a history of elevated hemoglobin A1c.  They have continue with a low carbohydrate/low-fat diet and is attempting 300 minutes of aerobic exercise weekly.  They understand the importance of following this regimen to prevent them from progressing into diabetes.  We will continue to monitor her hemoglobin A1c and if they do progress greater than 7% we will further modify the treatment regiment with medications excetra.      3. Cigarette nicotine dependence without complication   Patient does continue to smoke.  She is up-to-date on her CT scan.  We have encouraged discontinuation of tobacco usage and if she does have interest we may consider medical assistance.    4. Immunization declined   Patient would benefit from having immunizations given.  Patient has elected not to receive the recommended vaccines at present time.  They understand the risk of not receiving these vaccine and the disease in which they may incur.  We have discussed other options and will pursue with encouragement of compliance with future appointments.  If patient does change their minds prior to the next scheduled follow-up, they may contact us and we will provide immunization at that time.    5. Thoracic spine pain  Patient is having midline pain.  I am uncertain if she has a compression fracture.  Nonetheless we will obtain an x-ray to assure us there is no underlying abnormality.  -     XR Spine Thoracic 3 View; Future  -     baclofen (LIORESAL) 20 MG tablet; Take 1 tablet by mouth 3 (Three) Times a Day.  Dispense: 90 tablet; Refill: 0    6. Lumbar pain  Patient does not have any radiculopathy.  We will obtain x-rays and will pursue with treatment as necessary  -     XR Spine Lumbar Complete With Flex & Ext; Future  -     baclofen  (LIORESAL) 20 MG tablet; Take 1 tablet by mouth 3 (Three) Times a Day.  Dispense: 90 tablet; Refill: 0        Follow Up:   Return if symptoms worsen or fail to improve.      At [facility], we believe that sharing information builds trust and better relationships. You are receiving this note because you recently visited [facility]. It is possible you will see health information before a provider has talked with you about it. This kind of information can be easy to misunderstand. To help you fully understand what it means for your health, we urge you to discuss this note with your provider.    Alfa Clay MD  [facility]    Patient or patient representative verbalized consent for the use of Ambient Listening during the visit with  Alfa Clay MD for chart documentation. 1/29/2025  19:38 EST

## 2025-01-31 ENCOUNTER — TELEPHONE (OUTPATIENT)
Dept: FAMILY MEDICINE CLINIC | Facility: CLINIC | Age: 55
End: 2025-01-31
Payer: MEDICAID

## 2025-01-31 NOTE — TELEPHONE ENCOUNTER
----- Message from Alfa Clay sent at 1/29/2025  5:46 PM EST -----  She was right.  Mammogram was normal.

## 2025-02-07 NOTE — TELEPHONE ENCOUNTER
Attempted to call patient, no answer.  Vm left for patient to return call.   Relay   results

## 2025-06-24 ENCOUNTER — TELEPHONE (OUTPATIENT)
Dept: FAMILY MEDICINE CLINIC | Facility: CLINIC | Age: 55
End: 2025-06-24
Payer: MEDICAID

## 2025-06-24 NOTE — TELEPHONE ENCOUNTER
PT CALLED TO INQUIRE ABOUT A FALL THAT SHE HAD ON FRIDAY. SHE FELL BACKWARDS AND WAS NOT ABLE TO CATCH HERSELF. STATES THAT SHE HEARD A CRACKLING FROM HER NECK AND IN BETWEEN HER SHOULDER BLADES. SHE HAD SURGERY 11/2024 IN THAT SAME AREA. SHE IS EXPERIENCING TINGLING IN BOTH ARMS AND IN THE MIDDLE OF HER BACK. SHE WAS INFORMED THAT HSE NEEDED TO GO TO ED FOR EVAL JUST IN CASE HER HARDWARE HAD BEEN MOVED AROUND OR SOMETHING ELSE HAS HAPPENED. PT CONFIRMED UNDERSTANDING. HEADED TO [facility].

## 2025-06-24 NOTE — TELEPHONE ENCOUNTER
PT RETURNED CALL, STATED SHE HAD SCAN COMPLETED, HARDWARE LOOKS OKAY. THEY WANT HER TO FOLLOW UP WITH SURGEON AND HAVE MRI COMPLETED TO CONFIRM EVERYTHING UNDERNEATH HARDWARE IS OKAY. SHE WILL CALL THEM TO SCHEDULE AN APPOINTMENT. SCHEDULED TO SEE PCP 7/10/2025 FOR ED F/U

## 2025-07-10 ENCOUNTER — OFFICE VISIT (OUTPATIENT)
Dept: FAMILY MEDICINE CLINIC | Facility: CLINIC | Age: 55
End: 2025-07-10
Payer: MEDICAID

## 2025-07-10 VITALS
WEIGHT: 132 LBS | TEMPERATURE: 98 F | DIASTOLIC BLOOD PRESSURE: 50 MMHG | BODY MASS INDEX: 20.72 KG/M2 | RESPIRATION RATE: 18 BRPM | HEART RATE: 85 BPM | OXYGEN SATURATION: 96 % | HEIGHT: 67 IN | SYSTOLIC BLOOD PRESSURE: 102 MMHG

## 2025-07-10 DIAGNOSIS — Z28.21 IMMUNIZATION DECLINED: ICD-10-CM

## 2025-07-10 DIAGNOSIS — R73.09 ELEVATED HEMOGLOBIN A1C: ICD-10-CM

## 2025-07-10 DIAGNOSIS — M54.12 CERVICAL RADICULOPATHY: ICD-10-CM

## 2025-07-10 DIAGNOSIS — F17.210 CIGARETTE NICOTINE DEPENDENCE WITHOUT COMPLICATION: ICD-10-CM

## 2025-07-10 DIAGNOSIS — Z12.11 COLON CANCER SCREENING: ICD-10-CM

## 2025-07-10 DIAGNOSIS — M54.2 NECK PAIN: Primary | ICD-10-CM

## 2025-07-10 LAB
EXPIRATION DATE: ABNORMAL
HBA1C MFR BLD: 5.8 % (ref 4.5–5.7)
Lab: ABNORMAL

## 2025-07-10 RX ORDER — IBUPROFEN 800 MG/1
1 TABLET, FILM COATED ORAL
COMMUNITY
Start: 2025-06-19

## 2025-07-10 NOTE — PROGRESS NOTES
Follow Up Office Visit      Date: 07/10/2025   Patient Name: Fatuma Bolton  : 1970   MRN: [id number]     Chief Complaint:    Chief Complaint   Patient presents with   • Follow-up     ER follow up    • Back Pain   • Sinus Problem       History of Present Illness: Fatuma Bolton is a 54 y.o. female who is here today for assessment of cervical radiculopathy.    History of Present Illness  The patient is a 54-year-old female who presents for evaluation of numbness in her arms, speech changes, and health maintenance.    She reports experiencing numbness in both arms, with one side being more affected than the other. This numbness has remained consistent over time. She has not noticed any weakness or changes in motor strength but has been avoiding heavy lifting. The sensation is described as more akin to tingling than pain. Despite these symptoms, she continues to work and does not believe the numbness is severe enough to warrant pain medication. She has been advised against lifting until her neck condition is fully understood. An appointment with her surgeon is scheduled for 2025. She was seen in the emergency room and was advised to contact her surgeon regarding an MRI. A CT scan was performed, but no steroids were administered. She underwent neck surgery in 2024.    She has noticed a change in her speech, which she attributes to tooth extraction rather than trauma. She believes her speech was affected prior to her fall. She also reports increased nasal aspiration of food and liquids following dental surgery. She is currently relearning to swallow with new dental hardware. She has not consulted a speech therapist recently but did so in her youth. At that time, it was suggested that her condition could be treated like a cleft palate, but the doctor believed this would worsen her condition. She does not have a cleft palate. She had her tonsils removed in her 20s due to airway obstruction causing  breathing difficulties. Her uvula was also removed due to snoring, but this was done before the tonsillectomy, leading to food entering her nose and difficulty drinking from a water fountain. An ENT specialist has informed her that nothing can be done about her current symptoms. She has been experiencing intermittent aspiration for the past 35 years and is currently learning to manage it without speech therapy.    She has not yet undergone a finger prick test for her elevated hemoglobin A1c levels.    She has received a Cologuard kit in the mail for colon cancer screening.    She declines the shingles vaccine.    She sleeps well and uses marijuana to aid sleep. She also smokes cigarettes. She has albuterol but does not use it as she does not feel the need for it. She does not take muscle relaxers as they do not provide relief. She occasionally takes ibuprofen but has not taken it recently.    PAST SURGICAL HISTORY:  - Neck surgery in 11/2024  - Tonsillectomy in her 20s  - Uvula removal in her 20s    SOCIAL HISTORY  The patient smokes marijuana and cigarettes.     Patient appears to be doing otherwise well.  They have continue with their medications without any side effects.  They have not had any changes in their usual activity, appetite and sleep.  Patient denies any other cardiovascular, respiratory, gastrointestinal, urologic or neurologic complaints.    Subjective      Review of Systems:   Review of Systems   Constitutional:  Negative for activity change, appetite change and fatigue.   Respiratory:  Negative for cough, chest tightness, shortness of breath and wheezing.    Cardiovascular:  Negative for chest pain, palpitations and leg swelling.   Gastrointestinal:  Negative for abdominal distention, abdominal pain, blood in stool, constipation, diarrhea, nausea, vomiting, GERD and indigestion.   Genitourinary:  Negative for difficulty urinating, dysuria, flank pain, frequency, hematuria and urgency.  "  Musculoskeletal:  Negative for arthralgias, back pain, gait problem, joint swelling and myalgias.   Neurological:  Negative for dizziness, tremors, seizures, syncope, weakness, light-headedness, numbness, headache and memory problem.   Psychiatric/Behavioral:  Negative for sleep disturbance and depressed mood. The patient is not nervous/anxious.        I have reviewed the patients family history, social history, past medical history, past surgical history and have updated it as appropriate.     Medications:     Current Outpatient Medications:   •  ibuprofen (ADVIL,MOTRIN) 800 MG tablet, Take 1 tablet by mouth every 6 (six) to 8 (eight) hours as needed for Pain., Disp: , Rfl:   •  albuterol sulfate  (90 Base) MCG/ACT inhaler, Inhale 2 puffs Every 6 (Six) Hours As Needed for Wheezing., Disp: 18 g, Rfl: 11  •  baclofen (LIORESAL) 20 MG tablet, Take 1 tablet by mouth 3 (Three) Times a Day., Disp: 90 tablet, Rfl: 0    Allergies:   Allergies   Allergen Reactions   • Penicillins Rash       Immunizations:   Immunization History   Administered Date(s) Administered   • Pneumococcal Conjugate 20-Valent (PCV20) 06/12/2023   • Tdap 06/12/2023        Objective     Physical Exam: Please see above  Vital Signs:   Vitals:    07/10/25 1520   BP: 102/50   BP Location: Left arm   Patient Position: Sitting   Cuff Size: Adult   Pulse: 85   Resp: 18   Temp: 98 °F (36.7 °C)   TempSrc: Temporal   SpO2: 96%   Weight: 59.9 kg (132 lb)   Height: 170.2 cm (67.01\")     Body mass index is 20.67 kg/m².  BMI is within normal parameters. No other follow-up for BMI required.       Physical Exam  Vitals and nursing note reviewed.   Constitutional:       Appearance: Normal appearance.   HENT:      Head: Normocephalic and atraumatic.      Nose: Nose normal.      Mouth/Throat:      Pharynx: Oropharynx is clear.   Eyes:      Extraocular Movements: Extraocular movements intact.      Pupils: Pupils are equal, round, and reactive to light.   Neck: "      Thyroid: No thyroid mass or thyromegaly.      Trachea: Trachea normal.   Cardiovascular:      Rate and Rhythm: Normal rate and regular rhythm.      Pulses: Normal pulses. No decreased pulses.      Heart sounds: Normal heart sounds.   Pulmonary:      Effort: Pulmonary effort is normal.      Breath sounds: Normal breath sounds.   Abdominal:      General: Abdomen is flat. Bowel sounds are normal.      Palpations: Abdomen is soft.      Tenderness: There is no abdominal tenderness.   Musculoskeletal:      Cervical back: Neck supple.      Right lower leg: No edema.      Left lower leg: No edema.   Lymphadenopathy:      Cervical: No cervical adenopathy.   Skin:     General: Skin is warm and dry.   Neurological:      General: No focal deficit present.      Mental Status: She is alert and oriented to person, place, and time.      Sensory: Sensation is intact.      Motor: Motor function is intact.      Coordination: Coordination is intact.   Psychiatric:         Attention and Perception: Attention normal.         Mood and Affect: Mood normal.         Speech: Speech normal.         Behavior: Behavior normal.         Procedures    Results:   Labs:   Hemoglobin A1C   Date Value Ref Range Status   07/10/2025 5.8 (A) 4.5 - 5.7 % Final   10/28/2024 5.90 (H) 4.80 - 5.60 % Final     TSH   Date Value Ref Range Status   10/28/2024 1.590 0.270 - 4.200 uIU/mL Final        POCT Results (if applicable):   Results for orders placed or performed in visit on 07/10/25   POC Glycosylated Hemoglobin (Hb A1C)    Collection Time: 07/10/25  4:11 PM    Specimen: Blood   Result Value Ref Range    Hemoglobin A1C 5.8 (A) 4.5 - 5.7 %    Lot Number [id number]     Expiration Date 2/10/2027        Imaging:   No valid procedures specified.     Measures:   Advanced Care Planning:   Patient does not have an advance directive, information provided.    Smoking Cessation:   less than 3 minutes spent counseling Will try to cut down    Assessment / Plan       Assessment/Plan:   Diagnoses and all orders for this visit:    1. Neck pain (Primary)   Patient has been evaluated at the emergency department with a CT scan that did not reveal any abnormality.  She is having tingling of both of her arms without any motor symptomatology.  She does not have an appointment with a neurosurgeon.  We have discussed options and will initiate with workup of her neck.    2. Elevated hemoglobin A1c  Patient does have a history of elevated hemoglobin A1c.  They have continue with a low carbohydrate/low-fat diet and is attempting 300 minutes of aerobic exercise weekly.  They understand the importance of following this regimen to prevent them from progressing into diabetes.  We will continue to monitor her hemoglobin A1c and if they do progress greater than 7% we will further modify the treatment regiment with medications excetra.  -     POC Glycosylated Hemoglobin (Hb A1C)    3. Cigarette nicotine dependence without complication    4. Immunization declined   Patient would benefit from having immunizations given.  Patient has elected not to receive the recommended vaccines at present time.  They understand the risk of not receiving these vaccine and the disease in which they may incur.  We have discussed other options and will pursue with encouragement of compliance with future appointments.  If patient does change their minds prior to the next scheduled follow-up, they may contact us and we will provide immunization at that time.    5. Colon cancer screening   Patient has agreed to proceed with Cologuard.    6. Cervical radiculopathy  Patient is having some problems with her neck with what appears to be an associated radiculopathy.  We have discussed options and will pursue with an MRI.  She does have a phone call into the neurosurgeon who did surgery on her neck but it does appear to be pending at present time.  She has been encouraged to present to the emergency department if she  develops any motor weakness or worsening symptomatology.  -     MRI Cervical Spine With & Without Contrast; Future        Follow Up:   Return in about 12 days (around 7/22/2025).      At [facility], we believe that sharing information builds trust and better relationships. You are receiving this note because you recently visited [facility]. It is possible you will see health information before a provider has talked with you about it. This kind of information can be easy to misunderstand. To help you fully understand what it means for your health, we urge you to discuss this note with your provider.    Alfa Clay MD  [facility]    Patient or patient representative verbalized consent for the use of Ambient Listening during the visit with  Alfa Clay MD for chart documentation. 7/13/2025  15:47 EDT

## 2025-08-19 ENCOUNTER — OFFICE VISIT (OUTPATIENT)
Dept: FAMILY MEDICINE CLINIC | Facility: CLINIC | Age: 55
End: 2025-08-19
Payer: MEDICAID

## 2025-08-19 ENCOUNTER — TELEPHONE (OUTPATIENT)
Dept: FAMILY MEDICINE CLINIC | Facility: CLINIC | Age: 55
End: 2025-08-19

## 2025-08-19 VITALS
SYSTOLIC BLOOD PRESSURE: 100 MMHG | HEART RATE: 95 BPM | OXYGEN SATURATION: 96 % | TEMPERATURE: 98 F | WEIGHT: 131 LBS | BODY MASS INDEX: 20.56 KG/M2 | HEIGHT: 67 IN | RESPIRATION RATE: 16 BRPM | DIASTOLIC BLOOD PRESSURE: 50 MMHG

## 2025-08-19 DIAGNOSIS — Z87.891 PERSONAL HISTORY OF TOBACCO USE: ICD-10-CM

## 2025-08-19 DIAGNOSIS — R73.09 ELEVATED HEMOGLOBIN A1C: ICD-10-CM

## 2025-08-19 DIAGNOSIS — M54.12 CERVICAL RADICULOPATHY: Primary | ICD-10-CM

## 2025-08-19 DIAGNOSIS — Z28.21 IMMUNIZATION DECLINED: ICD-10-CM

## 2025-08-19 DIAGNOSIS — Z01.419 ROUTINE GYNECOLOGICAL EXAMINATION: ICD-10-CM

## 2025-08-19 DIAGNOSIS — Z53.20 COLON CANCER SCREENING DECLINED: ICD-10-CM

## 2025-08-19 DIAGNOSIS — J01.40 ACUTE NON-RECURRENT PANSINUSITIS: Primary | ICD-10-CM

## 2025-08-19 RX ORDER — DOXYCYCLINE 100 MG/1
100 CAPSULE ORAL 2 TIMES DAILY
Qty: 20 CAPSULE | Refills: 0 | Status: SHIPPED | OUTPATIENT
Start: 2025-08-19

## 2025-08-25 ENCOUNTER — LAB (OUTPATIENT)
Dept: FAMILY MEDICINE CLINIC | Facility: CLINIC | Age: 55
End: 2025-08-25
Payer: MEDICAID

## 2025-08-25 DIAGNOSIS — Z01.419 ROUTINE GYNECOLOGICAL EXAMINATION: ICD-10-CM

## 2025-08-25 PROCEDURE — 36415 COLL VENOUS BLD VENIPUNCTURE: CPT | Performed by: FAMILY MEDICINE

## 2025-08-25 PROCEDURE — G0432 EIA HIV-1/HIV-2 SCREEN: HCPCS | Performed by: FAMILY MEDICINE

## 2025-08-26 ENCOUNTER — RESULTS FOLLOW-UP (OUTPATIENT)
Dept: FAMILY MEDICINE CLINIC | Facility: CLINIC | Age: 55
End: 2025-08-26
Payer: MEDICAID

## 2025-08-26 LAB — HIV 1+2 AB+HIV1 P24 AG SERPL QL IA: NORMAL
